# Patient Record
Sex: FEMALE | Race: WHITE | NOT HISPANIC OR LATINO | Employment: UNEMPLOYED | URBAN - METROPOLITAN AREA
[De-identification: names, ages, dates, MRNs, and addresses within clinical notes are randomized per-mention and may not be internally consistent; named-entity substitution may affect disease eponyms.]

---

## 2022-01-01 ENCOUNTER — APPOINTMENT (INPATIENT)
Dept: RADIOLOGY | Facility: HOSPITAL | Age: 72
End: 2022-01-01

## 2022-01-01 ENCOUNTER — APPOINTMENT (EMERGENCY)
Dept: RADIOLOGY | Facility: HOSPITAL | Age: 72
End: 2022-01-01

## 2022-01-01 ENCOUNTER — APPOINTMENT (OUTPATIENT)
Dept: RADIOLOGY | Facility: HOSPITAL | Age: 72
End: 2022-01-01

## 2022-01-01 ENCOUNTER — HOSPITAL ENCOUNTER (INPATIENT)
Facility: HOSPITAL | Age: 72
LOS: 4 days | End: 2022-11-16
Attending: EMERGENCY MEDICINE | Admitting: INTERNAL MEDICINE

## 2022-01-01 ENCOUNTER — APPOINTMENT (EMERGENCY)
Dept: CT IMAGING | Facility: HOSPITAL | Age: 72
End: 2022-01-01

## 2022-01-01 ENCOUNTER — APPOINTMENT (INPATIENT)
Dept: NON INVASIVE DIAGNOSTICS | Facility: HOSPITAL | Age: 72
End: 2022-01-01

## 2022-01-01 ENCOUNTER — APPOINTMENT (OUTPATIENT)
Dept: NON INVASIVE DIAGNOSTICS | Facility: HOSPITAL | Age: 72
End: 2022-01-01

## 2022-01-01 ENCOUNTER — EPISODE CHANGES (OUTPATIENT)
Dept: CASE MANAGEMENT | Facility: OTHER | Age: 72
End: 2022-01-01

## 2022-01-01 ENCOUNTER — HOSPITAL ENCOUNTER (EMERGENCY)
Facility: HOSPITAL | Age: 72
Discharge: NURSING, REHAB OR LONG-TERM CARE | End: 2022-11-12
Attending: EMERGENCY MEDICINE

## 2022-01-01 VITALS
RESPIRATION RATE: 15 BRPM | WEIGHT: 112.1 LBS | BODY MASS INDEX: 18.02 KG/M2 | HEART RATE: 86 BPM | OXYGEN SATURATION: 85 % | SYSTOLIC BLOOD PRESSURE: 138 MMHG | DIASTOLIC BLOOD PRESSURE: 62 MMHG | TEMPERATURE: 97.7 F | HEIGHT: 66 IN

## 2022-01-01 VITALS
SYSTOLIC BLOOD PRESSURE: 154 MMHG | WEIGHT: 150 LBS | HEART RATE: 64 BPM | TEMPERATURE: 98.2 F | BODY MASS INDEX: 24.11 KG/M2 | RESPIRATION RATE: 20 BRPM | DIASTOLIC BLOOD PRESSURE: 71 MMHG | OXYGEN SATURATION: 96 % | HEIGHT: 66 IN

## 2022-01-01 DIAGNOSIS — I50.9 HEART FAILURE (HCC): Primary | ICD-10-CM

## 2022-01-01 DIAGNOSIS — I48.92 ATRIAL FLUTTER WITH RAPID VENTRICULAR RESPONSE (HCC): ICD-10-CM

## 2022-01-01 DIAGNOSIS — J96.01 ACUTE RESPIRATORY FAILURE WITH HYPOXIA (HCC): ICD-10-CM

## 2022-01-01 DIAGNOSIS — I50.9 CHF (CONGESTIVE HEART FAILURE) (HCC): Primary | ICD-10-CM

## 2022-01-01 DIAGNOSIS — R79.89 ELEVATED LACTIC ACID LEVEL: ICD-10-CM

## 2022-01-01 DIAGNOSIS — N28.89 RIGHT RENAL MASS: ICD-10-CM

## 2022-01-01 DIAGNOSIS — R77.8 ELEVATED TROPONIN: ICD-10-CM

## 2022-01-01 DIAGNOSIS — J90 BILATERAL PLEURAL EFFUSION: ICD-10-CM

## 2022-01-01 DIAGNOSIS — K43.9 VENTRAL HERNIA WITHOUT OBSTRUCTION OR GANGRENE: ICD-10-CM

## 2022-01-01 LAB
2HR DELTA HS TROPONIN: 0 NG/L
4HR DELTA HS TROPONIN: -21 NG/L
ALBUMIN SERPL BCP-MCNC: 2.1 G/DL (ref 3.5–5)
ALBUMIN SERPL BCP-MCNC: 2.3 G/DL (ref 3.5–5)
ALBUMIN SERPL BCP-MCNC: 2.7 G/DL (ref 3.5–5)
ALBUMIN SERPL BCP-MCNC: 2.8 G/DL (ref 3.5–5)
ALP SERPL-CCNC: 57 U/L (ref 46–116)
ALP SERPL-CCNC: 67 U/L (ref 46–116)
ALP SERPL-CCNC: 82 U/L (ref 46–116)
ALP SERPL-CCNC: 84 U/L (ref 46–116)
ALT SERPL W P-5'-P-CCNC: 125 U/L (ref 12–78)
ALT SERPL W P-5'-P-CCNC: 156 U/L (ref 12–78)
ALT SERPL W P-5'-P-CCNC: 204 U/L (ref 12–78)
ALT SERPL W P-5'-P-CCNC: 289 U/L (ref 12–78)
ANION GAP SERPL CALCULATED.3IONS-SCNC: 2 MMOL/L (ref 4–13)
ANION GAP SERPL CALCULATED.3IONS-SCNC: 2 MMOL/L (ref 4–13)
ANION GAP SERPL CALCULATED.3IONS-SCNC: 4 MMOL/L (ref 4–13)
ANISOCYTOSIS BLD QL SMEAR: PRESENT
AORTIC ROOT: 3.3 CM
AORTIC VALVE MEAN VELOCITY: 19.5 M/S
APICAL FOUR CHAMBER EJECTION FRACTION: 30 %
APTT PPP: 24 SECONDS (ref 23–37)
APTT PPP: 25 SECONDS (ref 23–37)
APTT PPP: 26 SECONDS (ref 23–37)
APTT PPP: 51 SECONDS (ref 23–37)
APTT PPP: 57 SECONDS (ref 23–37)
APTT PPP: 68 SECONDS (ref 23–37)
APTT PPP: 68 SECONDS (ref 23–37)
APTT PPP: 76 SECONDS (ref 23–37)
APTT PPP: 78 SECONDS (ref 23–37)
APTT PPP: 86 SECONDS (ref 23–37)
ARTERIAL PATENCY WRIST A: YES
AST SERPL W P-5'-P-CCNC: 53 U/L (ref 5–45)
AST SERPL W P-5'-P-CCNC: 70 U/L (ref 5–45)
AST SERPL W P-5'-P-CCNC: 87 U/L (ref 5–45)
ATRIAL RATE: 114 BPM
ATRIAL RATE: 270 BPM
ATRIAL RATE: 270 BPM
ATRIAL RATE: 272 BPM
ATRIAL RATE: 272 BPM
ATRIAL RATE: 65 BPM
ATRIAL RATE: 66 BPM
ATRIAL RATE: 67 BPM
ATRIAL RATE: 69 BPM
ATRIAL RATE: 72 BPM
AV AREA BY CONTINUOUS VTI: 0.9 CM2
AV AREA PEAK VELOCITY: 0.8 CM2
AV LVOT MEAN GRADIENT: 2 MMHG
AV LVOT PEAK GRADIENT: 4 MMHG
AV MEAN GRADIENT: 19 MMHG
AV PEAK GRADIENT: 43 MMHG
AV VALVE AREA: 0.86 CM2
AV VELOCITY RATIO: 0.3
BACTERIA BLD CULT: NORMAL
BACTERIA BLD CULT: NORMAL
BACTERIA UR QL AUTO: NORMAL /HPF
BASE EX.OXY STD BLDV CALC-SCNC: 80.8 % (ref 60–80)
BASE EX.OXY STD BLDV CALC-SCNC: 85.8 % (ref 60–80)
BASE EXCESS BLDA CALC-SCNC: 12.2 MMOL/L
BASE EXCESS BLDA CALC-SCNC: 15.9 MMOL/L
BASE EXCESS BLDA CALC-SCNC: 16.7 MMOL/L
BASE EXCESS BLDA CALC-SCNC: 19.7 MMOL/L
BASE EXCESS BLDA CALC-SCNC: 21.2 MMOL/L
BASE EXCESS BLDA CALC-SCNC: 34.4 MMOL/L
BASE EXCESS BLDA CALC-SCNC: 8.4 MMOL/L
BASE EXCESS BLDV CALC-SCNC: 21.8 MMOL/L
BASE EXCESS BLDV CALC-SCNC: 22.7 MMOL/L
BASOPHILS # BLD AUTO: 0.02 THOUSANDS/ÂΜL (ref 0–0.1)
BASOPHILS # BLD MANUAL: 0 THOUSAND/UL (ref 0–0.1)
BASOPHILS NFR BLD AUTO: 0 % (ref 0–1)
BASOPHILS NFR MAR MANUAL: 0 % (ref 0–1)
BILIRUB DIRECT SERPL-MCNC: 0.74 MG/DL (ref 0–0.2)
BILIRUB SERPL-MCNC: 1.3 MG/DL (ref 0.2–1)
BILIRUB SERPL-MCNC: 1.48 MG/DL (ref 0.2–1)
BILIRUB SERPL-MCNC: 1.58 MG/DL (ref 0.2–1)
BILIRUB SERPL-MCNC: 1.72 MG/DL (ref 0.2–1)
BILIRUB UR QL STRIP: NEGATIVE
BODY TEMPERATURE: 97.7 DEGREES FEHRENHEIT
BUN SERPL-MCNC: 17 MG/DL (ref 5–25)
BUN SERPL-MCNC: 18 MG/DL (ref 5–25)
BUN SERPL-MCNC: 19 MG/DL (ref 5–25)
BUN SERPL-MCNC: 20 MG/DL (ref 5–25)
BUN SERPL-MCNC: 22 MG/DL (ref 5–25)
BUN SERPL-MCNC: 22 MG/DL (ref 5–25)
BUN SERPL-MCNC: 24 MG/DL (ref 5–25)
BUN SERPL-MCNC: 30 MG/DL (ref 5–25)
BUN SERPL-MCNC: 46 MG/DL (ref 5–25)
CALCIUM ALBUM COR SERPL-MCNC: 10.1 MG/DL (ref 8.3–10.1)
CALCIUM ALBUM COR SERPL-MCNC: 10.4 MG/DL (ref 8.3–10.1)
CALCIUM ALBUM COR SERPL-MCNC: 9.9 MG/DL (ref 8.3–10.1)
CALCIUM SERPL-MCNC: 8.5 MG/DL (ref 8.3–10.1)
CALCIUM SERPL-MCNC: 8.6 MG/DL (ref 8.3–10.1)
CALCIUM SERPL-MCNC: 8.7 MG/DL (ref 8.3–10.1)
CALCIUM SERPL-MCNC: 8.8 MG/DL (ref 8.3–10.1)
CALCIUM SERPL-MCNC: 9 MG/DL (ref 8.3–10.1)
CALCIUM SERPL-MCNC: 9 MG/DL (ref 8.3–10.1)
CALCIUM SERPL-MCNC: 9.1 MG/DL (ref 8.3–10.1)
CALCIUM SERPL-MCNC: 9.2 MG/DL (ref 8.3–10.1)
CALCIUM SERPL-MCNC: 9.4 MG/DL (ref 8.3–10.1)
CARDIAC TROPONIN I PNL SERPL HS: 61 NG/L
CARDIAC TROPONIN I PNL SERPL HS: 82 NG/L
CARDIAC TROPONIN I PNL SERPL HS: 82 NG/L
CHLORIDE SERPL-SCNC: 82 MMOL/L (ref 96–108)
CHLORIDE SERPL-SCNC: 83 MMOL/L (ref 96–108)
CHLORIDE SERPL-SCNC: 84 MMOL/L (ref 96–108)
CHLORIDE SERPL-SCNC: 88 MMOL/L (ref 96–108)
CHLORIDE SERPL-SCNC: 89 MMOL/L (ref 96–108)
CHLORIDE SERPL-SCNC: 91 MMOL/L (ref 96–108)
CHLORIDE SERPL-SCNC: 92 MMOL/L (ref 96–108)
CHLORIDE SERPL-SCNC: 96 MMOL/L (ref 96–108)
CHLORIDE SERPL-SCNC: 98 MMOL/L (ref 96–108)
CHOLEST SERPL-MCNC: 189 MG/DL
CLARITY UR: CLEAR
CO2 SERPL-SCNC: 36 MMOL/L (ref 21–32)
CO2 SERPL-SCNC: 41 MMOL/L (ref 21–32)
CO2 SERPL-SCNC: 44 MMOL/L (ref 21–32)
CO2 SERPL-SCNC: >45 MMOL/L (ref 21–32)
COLOR UR: YELLOW
CREAT SERPL-MCNC: 0.59 MG/DL (ref 0.6–1.3)
CREAT SERPL-MCNC: 0.62 MG/DL (ref 0.6–1.3)
CREAT SERPL-MCNC: 0.69 MG/DL (ref 0.6–1.3)
CREAT SERPL-MCNC: 0.7 MG/DL (ref 0.6–1.3)
CREAT SERPL-MCNC: 0.76 MG/DL (ref 0.6–1.3)
CREAT SERPL-MCNC: 0.79 MG/DL (ref 0.6–1.3)
CREAT SERPL-MCNC: 0.8 MG/DL (ref 0.6–1.3)
CREAT SERPL-MCNC: 0.81 MG/DL (ref 0.6–1.3)
CREAT SERPL-MCNC: 1.15 MG/DL (ref 0.6–1.3)
D DIMER PPP FEU-MCNC: 7.46 UG/ML FEU
DIGOXIN SERPL-MCNC: 1.9 NG/ML (ref 0.8–2)
DOP CALC AO PEAK VEL: 3.19 M/S
DOP CALC AO VTI: 46.34 CM
DOP CALC LVOT AREA: 3.14 CM2
DOP CALC LVOT DIAMETER: 2 CM
DOP CALC LVOT PEAK VEL VTI: 12.72 CM
DOP CALC LVOT PEAK VEL: 0.96 M/S
DOP CALC LVOT STROKE INDEX: 23.7 ML/M2
DOP CALC LVOT STROKE VOLUME: 39.94
EOSINOPHIL # BLD AUTO: 0.01 THOUSAND/ÂΜL (ref 0–0.61)
EOSINOPHIL # BLD MANUAL: 0 THOUSAND/UL (ref 0–0.4)
EOSINOPHIL NFR BLD AUTO: 0 % (ref 0–6)
EOSINOPHIL NFR BLD MANUAL: 0 % (ref 0–6)
ERYTHROCYTE [DISTWIDTH] IN BLOOD BY AUTOMATED COUNT: 15.2 % (ref 11.6–15.1)
ERYTHROCYTE [DISTWIDTH] IN BLOOD BY AUTOMATED COUNT: 15.2 % (ref 11.6–15.1)
ERYTHROCYTE [DISTWIDTH] IN BLOOD BY AUTOMATED COUNT: 15.3 % (ref 11.6–15.1)
ERYTHROCYTE [DISTWIDTH] IN BLOOD BY AUTOMATED COUNT: 15.4 % (ref 11.6–15.1)
ERYTHROCYTE [DISTWIDTH] IN BLOOD BY AUTOMATED COUNT: 15.7 % (ref 11.6–15.1)
ERYTHROCYTE [DISTWIDTH] IN BLOOD BY AUTOMATED COUNT: 15.7 % (ref 11.6–15.1)
EST. AVERAGE GLUCOSE BLD GHB EST-MCNC: 123 MG/DL
FLUAV RNA RESP QL NAA+PROBE: NEGATIVE
FLUBV RNA RESP QL NAA+PROBE: NEGATIVE
FRACTIONAL SHORTENING: 20 (ref 28–44)
GFR SERPL CREATININE-BSD FRML MDRD: 47 ML/MIN/1.73SQ M
GFR SERPL CREATININE-BSD FRML MDRD: 73 ML/MIN/1.73SQ M
GFR SERPL CREATININE-BSD FRML MDRD: 74 ML/MIN/1.73SQ M
GFR SERPL CREATININE-BSD FRML MDRD: 75 ML/MIN/1.73SQ M
GFR SERPL CREATININE-BSD FRML MDRD: 79 ML/MIN/1.73SQ M
GFR SERPL CREATININE-BSD FRML MDRD: 87 ML/MIN/1.73SQ M
GFR SERPL CREATININE-BSD FRML MDRD: 87 ML/MIN/1.73SQ M
GFR SERPL CREATININE-BSD FRML MDRD: 91 ML/MIN/1.73SQ M
GFR SERPL CREATININE-BSD FRML MDRD: 92 ML/MIN/1.73SQ M
GLUCOSE SERPL-MCNC: 101 MG/DL (ref 65–140)
GLUCOSE SERPL-MCNC: 104 MG/DL (ref 65–140)
GLUCOSE SERPL-MCNC: 110 MG/DL (ref 65–140)
GLUCOSE SERPL-MCNC: 111 MG/DL (ref 65–140)
GLUCOSE SERPL-MCNC: 111 MG/DL (ref 65–140)
GLUCOSE SERPL-MCNC: 115 MG/DL (ref 65–140)
GLUCOSE SERPL-MCNC: 126 MG/DL (ref 65–140)
GLUCOSE SERPL-MCNC: 136 MG/DL (ref 65–140)
GLUCOSE SERPL-MCNC: 137 MG/DL (ref 65–140)
GLUCOSE SERPL-MCNC: 138 MG/DL (ref 65–140)
GLUCOSE SERPL-MCNC: 145 MG/DL (ref 65–140)
GLUCOSE SERPL-MCNC: 150 MG/DL (ref 65–140)
GLUCOSE SERPL-MCNC: 154 MG/DL (ref 65–140)
GLUCOSE SERPL-MCNC: 169 MG/DL (ref 65–140)
GLUCOSE SERPL-MCNC: 175 MG/DL (ref 65–140)
GLUCOSE SERPL-MCNC: 99 MG/DL (ref 65–140)
GLUCOSE SERPL-MCNC: 99 MG/DL (ref 65–140)
GLUCOSE UR STRIP-MCNC: NEGATIVE MG/DL
HBA1C MFR BLD: 5.9 %
HCO3 BLDA-SCNC: 39.1 MMOL/L (ref 22–28)
HCO3 BLDA-SCNC: 41.7 MMOL/L (ref 22–28)
HCO3 BLDA-SCNC: 44.4 MMOL/L (ref 22–28)
HCO3 BLDA-SCNC: 45.7 MMOL/L (ref 22–28)
HCO3 BLDA-SCNC: 48.8 MMOL/L (ref 22–28)
HCO3 BLDA-SCNC: 50 MMOL/L (ref 22–28)
HCO3 BLDA-SCNC: 67.9 MMOL/L (ref 22–28)
HCO3 BLDV-SCNC: 50.5 MMOL/L (ref 24–30)
HCO3 BLDV-SCNC: 53 MMOL/L (ref 24–30)
HCT VFR BLD AUTO: 37.9 % (ref 34.8–46.1)
HCT VFR BLD AUTO: 38.6 % (ref 34.8–46.1)
HCT VFR BLD AUTO: 38.7 % (ref 34.8–46.1)
HCT VFR BLD AUTO: 43.1 % (ref 34.8–46.1)
HCT VFR BLD AUTO: 43.5 % (ref 34.8–46.1)
HCT VFR BLD AUTO: 44.6 % (ref 34.8–46.1)
HDLC SERPL-MCNC: 42 MG/DL
HGB BLD-MCNC: 11.7 G/DL (ref 11.5–15.4)
HGB BLD-MCNC: 11.8 G/DL (ref 11.5–15.4)
HGB BLD-MCNC: 12.1 G/DL (ref 11.5–15.4)
HGB BLD-MCNC: 13.5 G/DL (ref 11.5–15.4)
HGB BLD-MCNC: 13.8 G/DL (ref 11.5–15.4)
HGB BLD-MCNC: 14.2 G/DL (ref 11.5–15.4)
HGB UR QL STRIP.AUTO: ABNORMAL
IMM GRANULOCYTES # BLD AUTO: 0.02 THOUSAND/UL (ref 0–0.2)
IMM GRANULOCYTES NFR BLD AUTO: 0 % (ref 0–2)
INR PPP: 1.11 (ref 0.84–1.19)
INR PPP: 1.22 (ref 0.84–1.19)
INR PPP: 1.26 (ref 0.84–1.19)
INTERVENTRICULAR SEPTUM IN DIASTOLE (PARASTERNAL SHORT AXIS VIEW): 1.5 CM
INTERVENTRICULAR SEPTUM: 1.5 CM (ref 0.6–1.1)
IPAP: 18
KETONES UR STRIP-MCNC: NEGATIVE MG/DL
LAAS-AP2: 32 CM2
LAAS-AP4: 32.4 CM2
LACTATE SERPL-SCNC: 1.4 MMOL/L (ref 0.5–2)
LACTATE SERPL-SCNC: 2.2 MMOL/L (ref 0.5–2)
LDLC SERPL CALC-MCNC: 126 MG/DL (ref 0–100)
LEFT ATRIUM SIZE: 4.7 CM
LEFT INTERNAL DIMENSION IN SYSTOLE: 4.1 CM (ref 2.1–4)
LEFT VENTRICLE DIASTOLIC VOLUME (MOD BIPLANE): 140 ML
LEFT VENTRICLE SYSTOLIC VOLUME (MOD BIPLANE): 90 ML
LEFT VENTRICULAR INTERNAL DIMENSION IN DIASTOLE: 5.1 CM (ref 3.5–6)
LEFT VENTRICULAR POSTERIOR WALL IN END DIASTOLE: 1.6 CM
LEFT VENTRICULAR STROKE VOLUME: 50 ML
LEUKOCYTE ESTERASE UR QL STRIP: NEGATIVE
LG PLATELETS BLD QL SMEAR: PRESENT
LV EF: 36 %
LVSV (TEICH): 50 ML
LYMPHOCYTES # BLD AUTO: 0.26 THOUSAND/UL (ref 0.6–4.47)
LYMPHOCYTES # BLD AUTO: 0.39 THOUSANDS/ÂΜL (ref 0.6–4.47)
LYMPHOCYTES # BLD AUTO: 2 % (ref 14–44)
LYMPHOCYTES NFR BLD AUTO: 5 % (ref 14–44)
MACROCYTES BLD QL AUTO: PRESENT
MAGNESIUM SERPL-MCNC: 1.4 MG/DL (ref 1.6–2.6)
MAGNESIUM SERPL-MCNC: 1.7 MG/DL (ref 1.6–2.6)
MAGNESIUM SERPL-MCNC: 1.9 MG/DL (ref 1.6–2.6)
MAGNESIUM SERPL-MCNC: 2 MG/DL (ref 1.6–2.6)
MCH RBC QN AUTO: 29.9 PG (ref 26.8–34.3)
MCH RBC QN AUTO: 30.4 PG (ref 26.8–34.3)
MCH RBC QN AUTO: 30.4 PG (ref 26.8–34.3)
MCH RBC QN AUTO: 30.8 PG (ref 26.8–34.3)
MCH RBC QN AUTO: 30.8 PG (ref 26.8–34.3)
MCH RBC QN AUTO: 31.2 PG (ref 26.8–34.3)
MCHC RBC AUTO-ENTMCNC: 30.2 G/DL (ref 31.4–37.4)
MCHC RBC AUTO-ENTMCNC: 30.6 G/DL (ref 31.4–37.4)
MCHC RBC AUTO-ENTMCNC: 31.3 G/DL (ref 31.4–37.4)
MCHC RBC AUTO-ENTMCNC: 31.7 G/DL (ref 31.4–37.4)
MCHC RBC AUTO-ENTMCNC: 31.8 G/DL (ref 31.4–37.4)
MCHC RBC AUTO-ENTMCNC: 31.9 G/DL (ref 31.4–37.4)
MCV RBC AUTO: 101 FL (ref 82–98)
MCV RBC AUTO: 96 FL (ref 82–98)
MCV RBC AUTO: 96 FL (ref 82–98)
MCV RBC AUTO: 98 FL (ref 82–98)
METAMYELOCYTES NFR BLD MANUAL: 1 % (ref 0–1)
MONOCYTES # BLD AUTO: 0.39 THOUSAND/UL (ref 0–1.22)
MONOCYTES # BLD AUTO: 0.58 THOUSAND/ÂΜL (ref 0.17–1.22)
MONOCYTES NFR BLD AUTO: 8 % (ref 4–12)
MONOCYTES NFR BLD: 3 % (ref 4–12)
NASAL CANNULA: 2
NEUTROPHILS # BLD AUTO: 6.22 THOUSANDS/ÂΜL (ref 1.85–7.62)
NEUTROPHILS # BLD MANUAL: 12.25 THOUSAND/UL (ref 1.85–7.62)
NEUTS BAND NFR BLD MANUAL: 14 % (ref 0–8)
NEUTS SEG NFR BLD AUTO: 80 % (ref 43–75)
NEUTS SEG NFR BLD AUTO: 87 % (ref 43–75)
NITRITE UR QL STRIP: NEGATIVE
NON VENT- BIPAP: ABNORMAL
NON-SQ EPI CELLS URNS QL MICRO: NORMAL /HPF
NRBC BLD AUTO-RTO: 0 /100 WBCS
NT-PROBNP SERPL-MCNC: ABNORMAL PG/ML
O2 CT BLDA-SCNC: 16.1 ML/DL (ref 16–23)
O2 CT BLDA-SCNC: 16.4 ML/DL (ref 16–23)
O2 CT BLDA-SCNC: 17.1 ML/DL (ref 16–23)
O2 CT BLDA-SCNC: 17.1 ML/DL (ref 16–23)
O2 CT BLDA-SCNC: 17.2 ML/DL (ref 16–23)
O2 CT BLDA-SCNC: 17.3 ML/DL (ref 16–23)
O2 CT BLDA-SCNC: 17.7 ML/DL (ref 16–23)
O2 CT BLDV-SCNC: 15 ML/DL
O2 CT BLDV-SCNC: 15.8 ML/DL
OXYHGB MFR BLDA: 85.1 % (ref 94–97)
OXYHGB MFR BLDA: 85.7 % (ref 94–97)
OXYHGB MFR BLDA: 91.7 % (ref 94–97)
OXYHGB MFR BLDA: 91.8 % (ref 94–97)
OXYHGB MFR BLDA: 91.9 % (ref 94–97)
OXYHGB MFR BLDA: 94.5 % (ref 94–97)
OXYHGB MFR BLDA: 94.5 % (ref 94–97)
P AXIS: 126 DEGREES
P AXIS: 247 DEGREES
P AXIS: 247 DEGREES
P AXIS: 267 DEGREES
P AXIS: 267 DEGREES
P AXIS: 69 DEGREES
P AXIS: 73 DEGREES
P AXIS: 91 DEGREES
PCO2 BLDA: 122.6 MM HG (ref 36–44)
PCO2 BLDA: 74.8 MM HG (ref 36–44)
PCO2 BLDA: 77.7 MM HG (ref 36–44)
PCO2 BLDA: 78.6 MM HG (ref 36–44)
PCO2 BLDA: 78.8 MM HG (ref 36–44)
PCO2 BLDA: 81.4 MM HG (ref 36–44)
PCO2 BLDA: 87 MM HG (ref 36–44)
PCO2 BLDV: 76.4 MM HG (ref 42–50)
PCO2 BLDV: 90.6 MM HG (ref 42–50)
PCO2 TEMP ADJ BLDA: 77.1 MM HG (ref 36–44)
PEEP MAX SETTING VENT: 10 CM[H2O]
PH BLD: 7.35 [PH] (ref 7.35–7.45)
PH BLDA: 7.27 [PH] (ref 7.35–7.45)
PH BLDA: 7.34 [PH] (ref 7.35–7.45)
PH BLDA: 7.36 [PH] (ref 7.35–7.45)
PH BLDA: 7.39 [PH] (ref 7.35–7.45)
PH BLDA: 7.39 [PH] (ref 7.35–7.45)
PH BLDA: 7.4 [PH] (ref 7.35–7.45)
PH BLDA: 7.42 [PH] (ref 7.35–7.45)
PH BLDV: 7.38 [PH] (ref 7.3–7.4)
PH BLDV: 7.44 [PH] (ref 7.3–7.4)
PH UR STRIP.AUTO: 5.5 [PH]
PHOSPHATE SERPL-MCNC: 2.3 MG/DL (ref 2.3–4.1)
PHOSPHATE SERPL-MCNC: 2.7 MG/DL (ref 2.3–4.1)
PHOSPHATE SERPL-MCNC: 2.8 MG/DL (ref 2.3–4.1)
PLATELET # BLD AUTO: 104 THOUSANDS/UL (ref 149–390)
PLATELET # BLD AUTO: 115 THOUSANDS/UL (ref 149–390)
PLATELET # BLD AUTO: 118 THOUSANDS/UL (ref 149–390)
PLATELET # BLD AUTO: 83 THOUSANDS/UL (ref 149–390)
PLATELET # BLD AUTO: 89 THOUSANDS/UL (ref 149–390)
PLATELET # BLD AUTO: 99 THOUSANDS/UL (ref 149–390)
PLATELET BLD QL SMEAR: ABNORMAL
PLATELET CLUMP BLD QL SMEAR: PRESENT
PMV BLD AUTO: 10.8 FL (ref 8.9–12.7)
PMV BLD AUTO: 10.8 FL (ref 8.9–12.7)
PMV BLD AUTO: 11 FL (ref 8.9–12.7)
PMV BLD AUTO: 11.2 FL (ref 8.9–12.7)
PMV BLD AUTO: 11.6 FL (ref 8.9–12.7)
PMV BLD AUTO: 11.7 FL (ref 8.9–12.7)
PO2 BLD: 51.4 MM HG (ref 75–129)
PO2 BLDA: 53.2 MM HG (ref 75–129)
PO2 BLDA: 56.7 MM HG (ref 75–129)
PO2 BLDA: 62.1 MM HG (ref 75–129)
PO2 BLDA: 65.5 MM HG (ref 75–129)
PO2 BLDA: 66.9 MM HG (ref 75–129)
PO2 BLDA: 81.2 MM HG (ref 75–129)
PO2 BLDA: 83.1 MM HG (ref 75–129)
PO2 BLDV: 46.5 MM HG (ref 35–45)
PO2 BLDV: 48.6 MM HG (ref 35–45)
POTASSIUM SERPL-SCNC: 3.2 MMOL/L (ref 3.5–5.3)
POTASSIUM SERPL-SCNC: 3.2 MMOL/L (ref 3.5–5.3)
POTASSIUM SERPL-SCNC: 3.5 MMOL/L (ref 3.5–5.3)
POTASSIUM SERPL-SCNC: 3.6 MMOL/L (ref 3.5–5.3)
POTASSIUM SERPL-SCNC: 3.6 MMOL/L (ref 3.5–5.3)
POTASSIUM SERPL-SCNC: 3.9 MMOL/L (ref 3.5–5.3)
POTASSIUM SERPL-SCNC: 4.9 MMOL/L (ref 3.5–5.3)
PR INTERVAL: 0 MS
PR INTERVAL: 138 MS
PR INTERVAL: 150 MS
PR INTERVAL: 150 MS
PR INTERVAL: 176 MS
PR INTERVAL: 532 MS
PROCALCITONIN SERPL-MCNC: 0.19 NG/ML
PROT SERPL-MCNC: 5.6 G/DL (ref 6.4–8.4)
PROT SERPL-MCNC: 5.7 G/DL (ref 6.4–8.4)
PROT SERPL-MCNC: 6.2 G/DL (ref 6.4–8.4)
PROT SERPL-MCNC: 6.7 G/DL (ref 6.4–8.4)
PROT UR STRIP-MCNC: ABNORMAL MG/DL
PROTHROMBIN TIME: 14.6 SECONDS (ref 11.6–14.5)
PROTHROMBIN TIME: 15.6 SECONDS (ref 11.6–14.5)
PROTHROMBIN TIME: 16 SECONDS (ref 11.6–14.5)
QRS AXIS: 55 DEGREES
QRS AXIS: 6 DEGREES
QRS AXIS: 6 DEGREES
QRS AXIS: 67 DEGREES
QRS AXIS: 74 DEGREES
QRS AXIS: 74 DEGREES
QRS AXIS: 81 DEGREES
QRS AXIS: 82 DEGREES
QRS AXIS: 84 DEGREES
QRS AXIS: 93 DEGREES
QRSD INTERVAL: 100 MS
QRSD INTERVAL: 100 MS
QRSD INTERVAL: 104 MS
QRSD INTERVAL: 104 MS
QRSD INTERVAL: 106 MS
QRSD INTERVAL: 106 MS
QRSD INTERVAL: 108 MS
QRSD INTERVAL: 121 MS
QRSD INTERVAL: 126 MS
QRSD INTERVAL: 126 MS
QT INTERVAL: 280 MS
QT INTERVAL: 280 MS
QT INTERVAL: 304 MS
QT INTERVAL: 304 MS
QT INTERVAL: 313 MS
QT INTERVAL: 371 MS
QT INTERVAL: 374 MS
QT INTERVAL: 379 MS
QT INTERVAL: 388 MS
QT INTERVAL: 388 MS
QTC INTERVAL: 388 MS
QTC INTERVAL: 406 MS
QTC INTERVAL: 406 MS
QTC INTERVAL: 407 MS
QTC INTERVAL: 410 MS
QTC INTERVAL: 420 MS
QTC INTERVAL: 420 MS
QTC INTERVAL: 431 MS
QTC INTERVAL: 457 MS
QTC INTERVAL: 457 MS
RA PRESSURE ESTIMATED: 15 MMHG
RBC # BLD AUTO: 3.85 MILLION/UL (ref 3.81–5.12)
RBC # BLD AUTO: 3.93 MILLION/UL (ref 3.81–5.12)
RBC # BLD AUTO: 3.94 MILLION/UL (ref 3.81–5.12)
RBC # BLD AUTO: 4.38 MILLION/UL (ref 3.81–5.12)
RBC # BLD AUTO: 4.43 MILLION/UL (ref 3.81–5.12)
RBC # BLD AUTO: 4.67 MILLION/UL (ref 3.81–5.12)
RBC #/AREA URNS AUTO: NORMAL /HPF
RIGHT ATRIAL 2D VOLUME: 55 ML
RIGHT ATRIUM AREA SYSTOLE A4C: 20.1 CM2
RIGHT VENTRICLE ID DIMENSION: 3.7 CM
RSV RNA RESP QL NAA+PROBE: NEGATIVE
RV PSP: 55 MMHG
SARS-COV-2 RNA RESP QL NAA+PROBE: NEGATIVE
SL CV LEFT ATRIUM LENGTH A2C: 6.8 CM
SL CV LV EF: 30
SL CV PED ECHO LEFT VENTRICLE DIASTOLIC VOLUME (MOD BIPLANE) 2D: 125 ML
SL CV PED ECHO LEFT VENTRICLE SYSTOLIC VOLUME (MOD BIPLANE) 2D: 75 ML
SODIUM SERPL-SCNC: 133 MMOL/L (ref 135–147)
SODIUM SERPL-SCNC: 134 MMOL/L (ref 135–147)
SODIUM SERPL-SCNC: 135 MMOL/L (ref 135–147)
SODIUM SERPL-SCNC: 136 MMOL/L (ref 135–147)
SODIUM SERPL-SCNC: 138 MMOL/L (ref 135–147)
SODIUM SERPL-SCNC: 139 MMOL/L (ref 135–147)
SODIUM SERPL-SCNC: 140 MMOL/L (ref 135–147)
SP GR UR STRIP.AUTO: 1.01 (ref 1–1.03)
SPECIMEN SOURCE: ABNORMAL
T WAVE AXIS: -89 DEGREES
T WAVE AXIS: 232 DEGREES
T WAVE AXIS: 252 DEGREES
T WAVE AXIS: 252 DEGREES
T WAVE AXIS: 256 DEGREES
T WAVE AXIS: 256 DEGREES
T WAVE AXIS: 261 DEGREES
T WAVE AXIS: 263 DEGREES
T WAVE AXIS: 267 DEGREES
T WAVE AXIS: 268 DEGREES
TR MAX PG: 40 MMHG
TR PEAK VELOCITY: 3.2 M/S
TRICUSPID VALVE PEAK REGURGITATION VELOCITY: 3.15 M/S
TRIGL SERPL-MCNC: 106 MG/DL
TSH SERPL DL<=0.05 MIU/L-ACNC: 3.38 UIU/ML (ref 0.45–4.5)
UROBILINOGEN UR QL STRIP.AUTO: 1 E.U./DL
VENT BIPAP FIO2: 50 %
VENT BIPAP FIO2: 70 %
VENT BIPAP FIO2: 70 %
VENT BIPAP FIO2: 90 %
VENTRICULAR RATE: 114 BPM
VENTRICULAR RATE: 135 BPM
VENTRICULAR RATE: 135 BPM
VENTRICULAR RATE: 136 BPM
VENTRICULAR RATE: 136 BPM
VENTRICULAR RATE: 65 BPM
VENTRICULAR RATE: 66 BPM
VENTRICULAR RATE: 67 BPM
VENTRICULAR RATE: 69 BPM
VENTRICULAR RATE: 72 BPM
WBC # BLD AUTO: 13.03 THOUSAND/UL (ref 4.31–10.16)
WBC # BLD AUTO: 7.07 THOUSAND/UL (ref 4.31–10.16)
WBC # BLD AUTO: 7.24 THOUSAND/UL (ref 4.31–10.16)
WBC # BLD AUTO: 7.89 THOUSAND/UL (ref 4.31–10.16)
WBC # BLD AUTO: 9.06 THOUSAND/UL (ref 4.31–10.16)
WBC # BLD AUTO: 9.72 THOUSAND/UL (ref 4.31–10.16)
WBC #/AREA URNS AUTO: NORMAL /HPF

## 2022-01-01 RX ORDER — ISOSORBIDE DINITRATE 20 MG/1
20 TABLET ORAL
Status: DISCONTINUED | OUTPATIENT
Start: 2022-01-01 | End: 2022-01-01

## 2022-01-01 RX ORDER — FUROSEMIDE 10 MG/ML
80 INJECTION INTRAMUSCULAR; INTRAVENOUS
Status: DISCONTINUED | OUTPATIENT
Start: 2022-01-01 | End: 2022-01-01

## 2022-01-01 RX ORDER — FUROSEMIDE 10 MG/ML
40 INJECTION INTRAMUSCULAR; INTRAVENOUS ONCE
Status: COMPLETED | OUTPATIENT
Start: 2022-01-01 | End: 2022-01-01

## 2022-01-01 RX ORDER — HEPARIN SODIUM 10000 [USP'U]/100ML
3-20 INJECTION, SOLUTION INTRAVENOUS
Status: DISCONTINUED | OUTPATIENT
Start: 2022-01-01 | End: 2022-01-01

## 2022-01-01 RX ORDER — ISOSORBIDE DINITRATE 10 MG/1
10 TABLET ORAL
Status: DISCONTINUED | OUTPATIENT
Start: 2022-01-01 | End: 2022-01-01

## 2022-01-01 RX ORDER — NITROGLYCERIN 20 MG/100ML
5-200 INJECTION INTRAVENOUS
Status: DISCONTINUED | OUTPATIENT
Start: 2022-01-01 | End: 2022-01-01

## 2022-01-01 RX ORDER — LIDOCAINE HYDROCHLORIDE 20 MG/ML
1 JELLY TOPICAL ONCE
Status: DISCONTINUED | OUTPATIENT
Start: 2022-01-01 | End: 2022-01-01

## 2022-01-01 RX ORDER — HEPARIN SODIUM 1000 [USP'U]/ML
1950 INJECTION, SOLUTION INTRAVENOUS; SUBCUTANEOUS
Status: DISCONTINUED | OUTPATIENT
Start: 2022-01-01 | End: 2022-01-01

## 2022-01-01 RX ORDER — LORAZEPAM 2 MG/ML
0.5 INJECTION INTRAMUSCULAR
Status: DISCONTINUED | OUTPATIENT
Start: 2022-01-01 | End: 2022-01-01 | Stop reason: HOSPADM

## 2022-01-01 RX ORDER — HEPARIN SODIUM 10000 [USP'U]/100ML
3-20 INJECTION, SOLUTION INTRAVENOUS
Status: DISCONTINUED | OUTPATIENT
Start: 2022-01-01 | End: 2022-01-01 | Stop reason: HOSPADM

## 2022-01-01 RX ORDER — LABETALOL HYDROCHLORIDE 5 MG/ML
10 INJECTION, SOLUTION INTRAVENOUS EVERY 6 HOURS
Status: DISCONTINUED | OUTPATIENT
Start: 2022-01-01 | End: 2022-01-01

## 2022-01-01 RX ORDER — CAPTOPRIL 12.5 MG/1
12.5 TABLET ORAL 3 TIMES DAILY
Status: DISCONTINUED | OUTPATIENT
Start: 2022-01-01 | End: 2022-01-01

## 2022-01-01 RX ORDER — LIDOCAINE 40 MG/G
CREAM TOPICAL ONCE
Status: DISCONTINUED | OUTPATIENT
Start: 2022-01-01 | End: 2022-01-01 | Stop reason: HOSPADM

## 2022-01-01 RX ORDER — LORAZEPAM 2 MG/ML
0.5 INJECTION INTRAMUSCULAR
Status: COMPLETED | OUTPATIENT
Start: 2022-01-01 | End: 2022-01-01

## 2022-01-01 RX ORDER — HYDRALAZINE HYDROCHLORIDE 50 MG/1
100 TABLET, FILM COATED ORAL EVERY 8 HOURS SCHEDULED
Status: DISCONTINUED | OUTPATIENT
Start: 2022-01-01 | End: 2022-01-01

## 2022-01-01 RX ORDER — POTASSIUM CHLORIDE 20 MEQ/1
40 TABLET, EXTENDED RELEASE ORAL 4 TIMES DAILY
Status: COMPLETED | OUTPATIENT
Start: 2022-01-01 | End: 2022-01-01

## 2022-01-01 RX ORDER — GLYCOPYRROLATE 0.2 MG/ML
0.1 INJECTION INTRAMUSCULAR; INTRAVENOUS EVERY 4 HOURS PRN
Status: DISCONTINUED | OUTPATIENT
Start: 2022-01-01 | End: 2022-01-01 | Stop reason: HOSPADM

## 2022-01-01 RX ORDER — HYDRALAZINE HYDROCHLORIDE 20 MG/ML
5 INJECTION INTRAMUSCULAR; INTRAVENOUS EVERY 8 HOURS
Status: DISCONTINUED | OUTPATIENT
Start: 2022-01-01 | End: 2022-01-01

## 2022-01-01 RX ORDER — POTASSIUM CHLORIDE 20 MEQ/1
40 TABLET, EXTENDED RELEASE ORAL ONCE
Status: COMPLETED | OUTPATIENT
Start: 2022-01-01 | End: 2022-01-01

## 2022-01-01 RX ORDER — MAGNESIUM SULFATE HEPTAHYDRATE 40 MG/ML
2 INJECTION, SOLUTION INTRAVENOUS ONCE
Status: COMPLETED | OUTPATIENT
Start: 2022-01-01 | End: 2022-01-01

## 2022-01-01 RX ORDER — NITROGLYCERIN 0.4 MG/1
0.4 TABLET SUBLINGUAL ONCE
Status: COMPLETED | OUTPATIENT
Start: 2022-01-01 | End: 2022-01-01

## 2022-01-01 RX ORDER — HEPARIN SODIUM 5000 [USP'U]/ML
5000 INJECTION, SOLUTION INTRAVENOUS; SUBCUTANEOUS EVERY 8 HOURS SCHEDULED
Status: DISCONTINUED | OUTPATIENT
Start: 2022-01-01 | End: 2022-01-01 | Stop reason: SDUPTHER

## 2022-01-01 RX ORDER — HYDRALAZINE HYDROCHLORIDE 20 MG/ML
10 INJECTION INTRAMUSCULAR; INTRAVENOUS EVERY 8 HOURS
Status: DISCONTINUED | OUTPATIENT
Start: 2022-01-01 | End: 2022-01-01

## 2022-01-01 RX ORDER — FUROSEMIDE 10 MG/ML
60 INJECTION INTRAMUSCULAR; INTRAVENOUS
Status: DISCONTINUED | OUTPATIENT
Start: 2022-01-01 | End: 2022-01-01

## 2022-01-01 RX ORDER — HEPARIN SODIUM 1000 [USP'U]/ML
1950 INJECTION, SOLUTION INTRAVENOUS; SUBCUTANEOUS
Status: DISCONTINUED | OUTPATIENT
Start: 2022-01-01 | End: 2022-01-01 | Stop reason: HOSPADM

## 2022-01-01 RX ORDER — METOPROLOL TARTRATE 5 MG/5ML
5 INJECTION INTRAVENOUS ONCE
Status: DISCONTINUED | OUTPATIENT
Start: 2022-01-01 | End: 2022-01-01 | Stop reason: HOSPADM

## 2022-01-01 RX ORDER — HEPARIN SODIUM 1000 [USP'U]/ML
3900 INJECTION, SOLUTION INTRAVENOUS; SUBCUTANEOUS
Status: DISCONTINUED | OUTPATIENT
Start: 2022-01-01 | End: 2022-01-01

## 2022-01-01 RX ORDER — DIGOXIN 0.25 MG/ML
125 INJECTION INTRAMUSCULAR; INTRAVENOUS EVERY OTHER DAY
Status: DISCONTINUED | OUTPATIENT
Start: 2022-01-01 | End: 2022-01-01

## 2022-01-01 RX ORDER — SODIUM CHLORIDE, SODIUM GLUCONATE, SODIUM ACETATE, POTASSIUM CHLORIDE, MAGNESIUM CHLORIDE, SODIUM PHOSPHATE, DIBASIC, AND POTASSIUM PHOSPHATE .53; .5; .37; .037; .03; .012; .00082 G/100ML; G/100ML; G/100ML; G/100ML; G/100ML; G/100ML; G/100ML
250 INJECTION, SOLUTION INTRAVENOUS ONCE
Status: COMPLETED | OUTPATIENT
Start: 2022-01-01 | End: 2022-01-01

## 2022-01-01 RX ORDER — CARVEDILOL 3.12 MG/1
3.12 TABLET ORAL 2 TIMES DAILY WITH MEALS
Status: DISCONTINUED | OUTPATIENT
Start: 2022-01-01 | End: 2022-01-01

## 2022-01-01 RX ORDER — NITROGLYCERIN 20 MG/100ML
5-200 INJECTION INTRAVENOUS
Status: DISCONTINUED | OUTPATIENT
Start: 2022-01-01 | End: 2022-01-01 | Stop reason: HOSPADM

## 2022-01-01 RX ORDER — HEPARIN SODIUM 1000 [USP'U]/ML
3900 INJECTION, SOLUTION INTRAVENOUS; SUBCUTANEOUS ONCE
Status: COMPLETED | OUTPATIENT
Start: 2022-01-01 | End: 2022-01-01

## 2022-01-01 RX ORDER — HEPARIN SODIUM 1000 [USP'U]/ML
3900 INJECTION, SOLUTION INTRAVENOUS; SUBCUTANEOUS
Status: DISCONTINUED | OUTPATIENT
Start: 2022-01-01 | End: 2022-01-01 | Stop reason: HOSPADM

## 2022-01-01 RX ORDER — DILTIAZEM HYDROCHLORIDE 5 MG/ML
15 INJECTION INTRAVENOUS ONCE
Status: COMPLETED | OUTPATIENT
Start: 2022-01-01 | End: 2022-01-01

## 2022-01-01 RX ORDER — FUROSEMIDE 10 MG/ML
20 INJECTION INTRAMUSCULAR; INTRAVENOUS ONCE
Status: COMPLETED | OUTPATIENT
Start: 2022-01-01 | End: 2022-01-01

## 2022-01-01 RX ORDER — POTASSIUM CHLORIDE 14.9 MG/ML
20 INJECTION INTRAVENOUS
Status: DISCONTINUED | OUTPATIENT
Start: 2022-01-01 | End: 2022-01-01

## 2022-01-01 RX ORDER — ISOSORBIDE DINITRATE 20 MG/1
20 TABLET ORAL EVERY 8 HOURS
Status: DISCONTINUED | OUTPATIENT
Start: 2022-01-01 | End: 2022-01-01

## 2022-01-01 RX ORDER — ISOSORBIDE DINITRATE 30 MG/1
30 TABLET ORAL EVERY 8 HOURS
Status: DISCONTINUED | OUTPATIENT
Start: 2022-01-01 | End: 2022-01-01

## 2022-01-01 RX ORDER — ALBUTEROL SULFATE 2.5 MG/3ML
2.5 SOLUTION RESPIRATORY (INHALATION) EVERY 4 HOURS PRN
Status: DISCONTINUED | OUTPATIENT
Start: 2022-01-01 | End: 2022-01-01

## 2022-01-01 RX ORDER — INSULIN LISPRO 100 [IU]/ML
1-5 INJECTION, SOLUTION INTRAVENOUS; SUBCUTANEOUS
Status: DISCONTINUED | OUTPATIENT
Start: 2022-01-01 | End: 2022-01-01

## 2022-01-01 RX ORDER — DIGOXIN 0.25 MG/ML
250 INJECTION INTRAMUSCULAR; INTRAVENOUS ONCE
Status: COMPLETED | OUTPATIENT
Start: 2022-01-01 | End: 2022-01-01

## 2022-01-01 RX ORDER — POTASSIUM CHLORIDE 14.9 MG/ML
20 INJECTION INTRAVENOUS
Status: COMPLETED | OUTPATIENT
Start: 2022-01-01 | End: 2022-01-01

## 2022-01-01 RX ORDER — DIGOXIN 0.25 MG/ML
125 INJECTION INTRAMUSCULAR; INTRAVENOUS DAILY
Status: DISCONTINUED | OUTPATIENT
Start: 2022-01-01 | End: 2022-01-01

## 2022-01-01 RX ORDER — HYDRALAZINE HYDROCHLORIDE 25 MG/1
25 TABLET, FILM COATED ORAL EVERY 8 HOURS SCHEDULED
Status: DISCONTINUED | OUTPATIENT
Start: 2022-01-01 | End: 2022-01-01

## 2022-01-01 RX ORDER — HYDRALAZINE HYDROCHLORIDE 20 MG/ML
5 INJECTION INTRAMUSCULAR; INTRAVENOUS ONCE
Status: COMPLETED | OUTPATIENT
Start: 2022-01-01 | End: 2022-01-01

## 2022-01-01 RX ORDER — DIGOXIN 0.25 MG/ML
250 INJECTION INTRAMUSCULAR; INTRAVENOUS EVERY 4 HOURS
Status: COMPLETED | OUTPATIENT
Start: 2022-01-01 | End: 2022-01-01

## 2022-01-01 RX ORDER — HYDRALAZINE HYDROCHLORIDE 50 MG/1
50 TABLET, FILM COATED ORAL EVERY 8 HOURS SCHEDULED
Status: DISCONTINUED | OUTPATIENT
Start: 2022-01-01 | End: 2022-01-01

## 2022-01-01 RX ADMIN — DIGOXIN 125 MCG: 0.25 INJECTION INTRAMUSCULAR; INTRAVENOUS at 13:13

## 2022-01-01 RX ADMIN — ISOSORBIDE DINITRATE 20 MG: 10 TABLET ORAL at 17:17

## 2022-01-01 RX ADMIN — MAGNESIUM SULFATE HEPTAHYDRATE 2 G: 40 INJECTION, SOLUTION INTRAVENOUS at 10:47

## 2022-01-01 RX ADMIN — MAGNESIUM SULFATE HEPTAHYDRATE 2 G: 40 INJECTION, SOLUTION INTRAVENOUS at 08:00

## 2022-01-01 RX ADMIN — MORPHINE SULFATE 2 MG: 2 INJECTION, SOLUTION INTRAMUSCULAR; INTRAVENOUS at 09:43

## 2022-01-01 RX ADMIN — LORAZEPAM 0.5 MG: 2 INJECTION INTRAMUSCULAR; INTRAVENOUS at 17:16

## 2022-01-01 RX ADMIN — ISOSORBIDE DINITRATE 20 MG: 10 TABLET ORAL at 13:11

## 2022-01-01 RX ADMIN — POTASSIUM CHLORIDE 20 MEQ: 14.9 INJECTION, SOLUTION INTRAVENOUS at 06:58

## 2022-01-01 RX ADMIN — NITROGLYCERIN 80 MCG/MIN: 20 INJECTION INTRAVENOUS at 06:09

## 2022-01-01 RX ADMIN — FUROSEMIDE 80 MG: 10 INJECTION, SOLUTION INTRAMUSCULAR; INTRAVENOUS at 17:16

## 2022-01-01 RX ADMIN — MORPHINE SULFATE 2 MG: 2 INJECTION, SOLUTION INTRAMUSCULAR; INTRAVENOUS at 04:57

## 2022-01-01 RX ADMIN — MORPHINE SULFATE 2 MG: 2 INJECTION, SOLUTION INTRAMUSCULAR; INTRAVENOUS at 22:23

## 2022-01-01 RX ADMIN — HEPARIN SODIUM 18 UNITS/KG/HR: 10000 INJECTION, SOLUTION INTRAVENOUS at 04:00

## 2022-01-01 RX ADMIN — FUROSEMIDE 80 MG: 10 INJECTION, SOLUTION INTRAMUSCULAR; INTRAVENOUS at 16:17

## 2022-01-01 RX ADMIN — LORAZEPAM 0.5 MG: 2 INJECTION INTRAMUSCULAR; INTRAVENOUS at 11:55

## 2022-01-01 RX ADMIN — HEPARIN SODIUM 3900 UNITS: 1000 INJECTION INTRAVENOUS; SUBCUTANEOUS at 23:22

## 2022-01-01 RX ADMIN — NITROGLYCERIN 185 MCG/MIN: 20 INJECTION INTRAVENOUS at 01:41

## 2022-01-01 RX ADMIN — NITROGLYCERIN 140 MCG/MIN: 20 INJECTION INTRAVENOUS at 10:53

## 2022-01-01 RX ADMIN — HEPARIN SODIUM 16 UNITS/KG/HR: 10000 INJECTION, SOLUTION INTRAVENOUS at 10:54

## 2022-01-01 RX ADMIN — HEPARIN SODIUM 1950 UNITS: 1000 INJECTION INTRAVENOUS; SUBCUTANEOUS at 12:35

## 2022-01-01 RX ADMIN — NITROGLYCERIN 140 MCG/MIN: 20 INJECTION INTRAVENOUS at 10:57

## 2022-01-01 RX ADMIN — MORPHINE SULFATE 2 MG: 2 INJECTION, SOLUTION INTRAMUSCULAR; INTRAVENOUS at 11:08

## 2022-01-01 RX ADMIN — POTASSIUM CHLORIDE 40 MEQ: 1500 TABLET, EXTENDED RELEASE ORAL at 17:16

## 2022-01-01 RX ADMIN — DIGOXIN 125 MCG: 0.25 INJECTION INTRAMUSCULAR; INTRAVENOUS at 08:24

## 2022-01-01 RX ADMIN — NITROGLYCERIN 165 MCG/MIN: 20 INJECTION INTRAVENOUS at 20:53

## 2022-01-01 RX ADMIN — MORPHINE SULFATE 2 MG: 2 INJECTION, SOLUTION INTRAMUSCULAR; INTRAVENOUS at 13:00

## 2022-01-01 RX ADMIN — DIGOXIN 250 MCG: 250 INJECTION, SOLUTION INTRAMUSCULAR; INTRAVENOUS; PARENTERAL at 02:50

## 2022-01-01 RX ADMIN — LORAZEPAM 0.5 MG: 2 INJECTION INTRAMUSCULAR; INTRAVENOUS at 14:53

## 2022-01-01 RX ADMIN — NITROGLYCERIN 70 MCG/MIN: 20 INJECTION INTRAVENOUS at 06:58

## 2022-01-01 RX ADMIN — FUROSEMIDE 80 MG: 10 INJECTION, SOLUTION INTRAMUSCULAR; INTRAVENOUS at 08:01

## 2022-01-01 RX ADMIN — LABETALOL HYDROCHLORIDE 10 MG: 5 INJECTION, SOLUTION INTRAVENOUS at 10:33

## 2022-01-01 RX ADMIN — HYDRALAZINE HYDROCHLORIDE 50 MG: 50 TABLET, FILM COATED ORAL at 13:21

## 2022-01-01 RX ADMIN — HEPARIN SODIUM 12 UNITS/KG/HR: 10000 INJECTION, SOLUTION INTRAVENOUS at 23:21

## 2022-01-01 RX ADMIN — HEPARIN SODIUM 18 UNITS/KG/HR: 10000 INJECTION, SOLUTION INTRAVENOUS at 08:35

## 2022-01-01 RX ADMIN — DIGOXIN 125 MCG: 0.25 INJECTION INTRAMUSCULAR; INTRAVENOUS at 09:25

## 2022-01-01 RX ADMIN — HYDRALAZINE HYDROCHLORIDE 50 MG: 50 TABLET, FILM COATED ORAL at 06:52

## 2022-01-01 RX ADMIN — HEPARIN SODIUM 18 UNITS/KG/HR: 10000 INJECTION, SOLUTION INTRAVENOUS at 04:26

## 2022-01-01 RX ADMIN — LORAZEPAM 0.5 MG: 2 INJECTION INTRAMUSCULAR; INTRAVENOUS at 19:43

## 2022-01-01 RX ADMIN — ISOSORBIDE DINITRATE 10 MG: 10 TABLET ORAL at 13:18

## 2022-01-01 RX ADMIN — NITROGLYCERIN 50 MCG/MIN: 20 INJECTION INTRAVENOUS at 13:23

## 2022-01-01 RX ADMIN — MORPHINE SULFATE 2 MG: 2 INJECTION, SOLUTION INTRAMUSCULAR; INTRAVENOUS at 14:11

## 2022-01-01 RX ADMIN — FUROSEMIDE 40 MG: 10 INJECTION, SOLUTION INTRAMUSCULAR; INTRAVENOUS at 10:53

## 2022-01-01 RX ADMIN — NITROGLYCERIN 200 MCG/MIN: 20 INJECTION INTRAVENOUS at 19:23

## 2022-01-01 RX ADMIN — POTASSIUM CHLORIDE 20 MEQ: 14.9 INJECTION, SOLUTION INTRAVENOUS at 09:36

## 2022-01-01 RX ADMIN — FUROSEMIDE 80 MG: 10 INJECTION, SOLUTION INTRAMUSCULAR; INTRAVENOUS at 07:58

## 2022-01-01 RX ADMIN — MORPHINE SULFATE 2 MG: 2 INJECTION, SOLUTION INTRAMUSCULAR; INTRAVENOUS at 19:43

## 2022-01-01 RX ADMIN — DILTIAZEM HYDROCHLORIDE 15 MG: 5 INJECTION INTRAVENOUS at 11:49

## 2022-01-01 RX ADMIN — DIGOXIN 250 MCG: 0.25 INJECTION INTRAMUSCULAR; INTRAVENOUS at 18:09

## 2022-01-01 RX ADMIN — ISOSORBIDE DINITRATE 20 MG: 20 TABLET ORAL at 06:52

## 2022-01-01 RX ADMIN — CARVEDILOL 3.12 MG: 3.12 TABLET, FILM COATED ORAL at 15:27

## 2022-01-01 RX ADMIN — ISOSORBIDE DINITRATE 30 MG: 30 TABLET ORAL at 13:13

## 2022-01-01 RX ADMIN — FUROSEMIDE 40 MG: 10 INJECTION, SOLUTION INTRAMUSCULAR; INTRAVENOUS at 11:21

## 2022-01-01 RX ADMIN — MORPHINE SULFATE 2 MG: 2 INJECTION, SOLUTION INTRAMUSCULAR; INTRAVENOUS at 02:30

## 2022-01-01 RX ADMIN — IOHEXOL 100 ML: 350 INJECTION, SOLUTION INTRAVENOUS at 15:04

## 2022-01-01 RX ADMIN — NITROGLYCERIN 200 MCG/MIN: 20 INJECTION INTRAVENOUS at 10:45

## 2022-01-01 RX ADMIN — ISOSORBIDE DINITRATE 30 MG: 30 TABLET ORAL at 21:37

## 2022-01-01 RX ADMIN — HYDRALAZINE HYDROCHLORIDE 50 MG: 50 TABLET, FILM COATED ORAL at 21:37

## 2022-01-01 RX ADMIN — SODIUM CHLORIDE, SODIUM GLUCONATE, SODIUM ACETATE, POTASSIUM CHLORIDE, MAGNESIUM CHLORIDE, SODIUM PHOSPHATE, DIBASIC, AND POTASSIUM PHOSPHATE 250 ML: .53; .5; .37; .037; .03; .012; .00082 INJECTION, SOLUTION INTRAVENOUS at 13:00

## 2022-01-01 RX ADMIN — HYDRALAZINE HYDROCHLORIDE 5 MG: 20 INJECTION, SOLUTION INTRAMUSCULAR; INTRAVENOUS at 00:16

## 2022-01-01 RX ADMIN — FUROSEMIDE 40 MG: 10 INJECTION, SOLUTION INTRAMUSCULAR; INTRAVENOUS at 04:13

## 2022-01-01 RX ADMIN — FUROSEMIDE 20 MG: 10 INJECTION, SOLUTION INTRAMUSCULAR; INTRAVENOUS at 13:22

## 2022-01-01 RX ADMIN — HYDRALAZINE HYDROCHLORIDE 10 MG: 20 INJECTION, SOLUTION INTRAMUSCULAR; INTRAVENOUS at 08:15

## 2022-01-01 RX ADMIN — DILTIAZEM HYDROCHLORIDE 5 MG/HR: 5 INJECTION INTRAVENOUS at 13:15

## 2022-01-01 RX ADMIN — HYDRALAZINE HYDROCHLORIDE 25 MG: 25 TABLET, FILM COATED ORAL at 21:35

## 2022-01-01 RX ADMIN — POTASSIUM CHLORIDE 40 MEQ: 1500 TABLET, EXTENDED RELEASE ORAL at 19:50

## 2022-01-01 RX ADMIN — CARVEDILOL 3.12 MG: 3.12 TABLET, FILM COATED ORAL at 06:52

## 2022-01-01 RX ADMIN — NITROGLYCERIN 90 MCG/MIN: 20 INJECTION INTRAVENOUS at 00:20

## 2022-01-01 RX ADMIN — POTASSIUM CHLORIDE 20 MEQ: 14.9 INJECTION, SOLUTION INTRAVENOUS at 11:47

## 2022-01-01 RX ADMIN — NITROGLYCERIN 165 MCG/MIN: 20 INJECTION INTRAVENOUS at 15:57

## 2022-01-01 RX ADMIN — NITROGLYCERIN 195 MCG/MIN: 20 INJECTION INTRAVENOUS at 15:10

## 2022-01-01 RX ADMIN — HYDRALAZINE HYDROCHLORIDE 10 MG: 20 INJECTION, SOLUTION INTRAMUSCULAR; INTRAVENOUS at 17:16

## 2022-01-01 RX ADMIN — HEPARIN SODIUM 16 UNITS/KG/HR: 10000 INJECTION, SOLUTION INTRAVENOUS at 10:57

## 2022-01-01 RX ADMIN — MORPHINE SULFATE 2 MG: 2 INJECTION, SOLUTION INTRAMUSCULAR; INTRAVENOUS at 06:37

## 2022-01-01 RX ADMIN — POTASSIUM CHLORIDE 40 MEQ: 1500 TABLET, EXTENDED RELEASE ORAL at 22:31

## 2022-01-01 RX ADMIN — POTASSIUM CHLORIDE 20 MEQ: 14.9 INJECTION, SOLUTION INTRAVENOUS at 09:02

## 2022-01-01 RX ADMIN — MAGNESIUM SULFATE HEPTAHYDRATE 2 G: 40 INJECTION, SOLUTION INTRAVENOUS at 09:36

## 2022-01-01 RX ADMIN — DIGOXIN 250 MCG: 250 INJECTION, SOLUTION INTRAMUSCULAR; INTRAVENOUS; PARENTERAL at 06:36

## 2022-01-01 RX ADMIN — NITROGLYCERIN 190 MCG/MIN: 20 INJECTION INTRAVENOUS at 06:35

## 2022-01-01 RX ADMIN — CARVEDILOL 3.12 MG: 3.12 TABLET, FILM COATED ORAL at 21:35

## 2022-01-01 RX ADMIN — HYDRALAZINE HYDROCHLORIDE 5 MG: 20 INJECTION, SOLUTION INTRAMUSCULAR; INTRAVENOUS at 23:06

## 2022-01-01 RX ADMIN — NITROGLYCERIN 0.4 MG: 0.4 TABLET SUBLINGUAL at 04:34

## 2022-01-01 RX ADMIN — MORPHINE SULFATE 2 MG: 2 INJECTION, SOLUTION INTRAMUSCULAR; INTRAVENOUS at 01:00

## 2022-01-01 RX ADMIN — DIGOXIN 250 MCG: 250 INJECTION, SOLUTION INTRAMUSCULAR; INTRAVENOUS; PARENTERAL at 23:41

## 2022-01-01 RX ADMIN — FUROSEMIDE 60 MG: 10 INJECTION, SOLUTION INTRAMUSCULAR; INTRAVENOUS at 09:04

## 2022-01-01 RX ADMIN — POTASSIUM CHLORIDE 40 MEQ: 1500 TABLET, EXTENDED RELEASE ORAL at 06:53

## 2022-01-01 RX ADMIN — LABETALOL HYDROCHLORIDE 10 MG: 5 INJECTION, SOLUTION INTRAVENOUS at 21:35

## 2022-01-01 RX ADMIN — HEPARIN SODIUM 3900 UNITS: 1000 INJECTION INTRAVENOUS; SUBCUTANEOUS at 06:05

## 2022-01-01 RX ADMIN — HEPARIN SODIUM 1950 UNITS: 1000 INJECTION INTRAVENOUS; SUBCUTANEOUS at 07:58

## 2022-01-01 RX ADMIN — MORPHINE SULFATE 2 MG: 2 INJECTION, SOLUTION INTRAMUSCULAR; INTRAVENOUS at 14:52

## 2022-01-01 RX ADMIN — POTASSIUM CHLORIDE 40 MEQ: 1500 TABLET, EXTENDED RELEASE ORAL at 18:10

## 2022-01-01 RX ADMIN — FUROSEMIDE 80 MG: 10 INJECTION, SOLUTION INTRAMUSCULAR; INTRAVENOUS at 15:15

## 2022-01-01 RX ADMIN — DILTIAZEM HYDROCHLORIDE 15 MG/HR: 5 INJECTION INTRAVENOUS at 23:14

## 2022-01-01 RX ADMIN — MORPHINE SULFATE 2 MG: 2 INJECTION, SOLUTION INTRAMUSCULAR; INTRAVENOUS at 17:16

## 2022-01-01 RX ADMIN — ISOSORBIDE DINITRATE 10 MG: 10 TABLET ORAL at 18:09

## 2022-11-11 NOTE — ED NOTES
Per provider, keep cardizem infusing at 15mg/hr     Keyla Blackmon, [facility]  11/11/22 2812

## 2022-11-11 NOTE — ED NOTES
Xray at bedside     [facility]  11/11/22 4001

## 2022-11-11 NOTE — ED PROVIDER NOTES
History  Chief Complaint   Patient presents with   • Weakness - Generalized     Weakness beginning in the beginning of September, since has BLE edema, worse in the R leg  Loss of appetite, Significant weight loss  Has not seen a doctor since 5      66 year old female with PMH tobacco abuse presents ambulatory from home accompanied by family for further evaluation  Pt states she is only here because her family made her come  She has no established past medical history  She has been having generalized weakness over the past few months  She has noticed leg swelling  She has had very little to no appetite  Her urine output has been diminished  Family notes weight loss over the past few weeks  Pt has not seen a physician or medical provider in several decades  Daughter notes she hasn't been to a doctor since 1977 when she was born  She denies any home medications  She takes OTC ibuprofen as needed on occasion  She reports she quit smoking in September of this year  She admits to SOB  She admits to a cough which has been ongoing  Denies fever, chills  Denies chest pain  Denies N/V/D, abdominal pain  No reported aggravating or alleviating factors  No specific treatments tried  No prior evaluation  History provided by:  Patient and relative   used: No        None       History reviewed  No pertinent past medical history  Past Surgical History:   Procedure Laterality Date   • APPENDECTOMY     • TONSILLECTOMY     • WRIST SURGERY         History reviewed  No pertinent family history  I have reviewed and agree with the history as documented  E-Cigarette/Vaping     E-Cigarette/Vaping Substances     Social History     Tobacco Use   • Smoking status: Current Every Day Smoker   • Smokeless tobacco: Never Used   Substance Use Topics   • Drug use: Never       Review of Systems   Constitutional: Positive for appetite change, fatigue and unexpected weight change   Negative for chills and fever  HENT: Negative  Negative for congestion, rhinorrhea and sore throat  Eyes: Negative  Negative for visual disturbance  Respiratory: Positive for cough  Negative for shortness of breath and wheezing  Cardiovascular: Positive for leg swelling  Negative for chest pain and palpitations  Gastrointestinal: Negative  Negative for abdominal pain, constipation, diarrhea, nausea and vomiting  Genitourinary: Positive for decreased urine volume  Negative for dysuria, flank pain, frequency and hematuria  Musculoskeletal: Negative  Negative for back pain and neck pain  Skin: Negative  Negative for rash  Neurological: Negative  Negative for dizziness, syncope, light-headedness and headaches  Psychiatric/Behavioral: Negative  Negative for confusion  All other systems reviewed and are negative  Physical Exam  Physical Exam  Vitals and nursing note reviewed  Constitutional:       General: She is awake  She is not in acute distress  Appearance: She is well-developed  She is ill-appearing  Interventions: Nasal cannula in place  HENT:      Head: Normocephalic and atraumatic  Right Ear: Hearing and external ear normal       Left Ear: Hearing and external ear normal       Nose: Nose normal       Mouth/Throat:      Mouth: Mucous membranes are dry  Tongue: Tongue does not deviate from midline  Pharynx: Oropharynx is clear  Uvula midline  Eyes:      General: Lids are normal  No scleral icterus  Conjunctiva/sclera: Conjunctivae normal       Pupils: Pupils are equal, round, and reactive to light  Neck:      Trachea: Trachea and phonation normal  No tracheal deviation  Cardiovascular:      Rate and Rhythm: Tachycardia present  Rhythm regularly irregular  Pulses: Normal pulses  Radial pulses are 2+ on the right side and 2+ on the left side  Dorsalis pedis pulses are 2+ on the right side and 2+ on the left side          Posterior tibial pulses are 2+ on the right side and 2+ on the left side  Heart sounds: Normal heart sounds, S1 normal and S2 normal    Pulmonary:      Effort: Tachypnea present  No respiratory distress  Breath sounds: Rales present  No wheezing or rhonchi  Comments: +coughing noted  Abdominal:      General: Bowel sounds are normal  There is distension  Palpations: Abdomen is soft  Tenderness: There is no abdominal tenderness  There is no guarding or rebound  Hernia: A hernia is present  Hernia is present in the ventral area  Musculoskeletal:      Cervical back: Normal range of motion and neck supple  Right lower leg: Edema present  Left lower leg: Edema present  Comments: R>L lower extremity pitting edema   Skin:     General: Skin is warm and dry  Capillary Refill: Capillary refill takes 2 to 3 seconds  Findings: No rash  Neurological:      General: No focal deficit present  Mental Status: She is alert and oriented to person, place, and time  GCS: GCS eye subscore is 4  GCS verbal subscore is 5  GCS motor subscore is 6  Cranial Nerves: No cranial nerve deficit  Sensory: No sensory deficit  Motor: No abnormal muscle tone     Psychiatric:         Mood and Affect: Mood normal          Speech: Speech normal          Behavior: Behavior normal          Vital Signs  ED Triage Vitals   Temperature Pulse Respirations Blood Pressure SpO2   11/11/22 1049 11/11/22 1049 11/11/22 1049 11/11/22 1049 11/11/22 1049   98 2 °F (36 8 °C) (!) 134 (!) 35 161/98 (!) 81 %      Temp Source Heart Rate Source Patient Position - Orthostatic VS BP Location FiO2 (%)   11/11/22 1049 11/11/22 1049 11/11/22 1049 11/11/22 1049 --   Temporal Monitor Lying Right arm       Pain Score       11/11/22 1100       No Pain           Vitals:    11/11/22 1845 11/11/22 1915 11/11/22 1930 11/11/22 2100   BP: 135/64 115/68 140/65 161/95   Pulse: (!) 121 (!) 120 98 (!) 115   Patient Position - Orthostatic VS: Sitting Sitting Sitting Lying         Visual Acuity      ED Medications  Medications   metoprolol (LOPRESSOR) injection 5 mg (0 mg Intravenous Hold 11/11/22 1940)   heparin (porcine) 25,000 units in 0 45% NaCl 250 mL infusion (premix) (12 Units/kg/hr × 65 kg (Order-Specific) Intravenous New Bag 11/11/22 2321)   heparin (porcine) injection 3,900 Units (has no administration in time range)   heparin (porcine) injection 1,950 Units (has no administration in time range)   digoxin (LANOXIN) injection 250 mcg (has no administration in time range)   furosemide (LASIX) injection 40 mg (40 mg Intravenous Given 11/11/22 1121)   diltiazem (CARDIZEM) injection 15 mg (15 mg Intravenous Given 11/11/22 1149)   multi-electrolyte (ISOLYTE-S PH 7 4) bolus 250 mL (0 mL Intravenous Stopped 11/11/22 1400)   diltiazem (CARDIZEM) 125 mg in sodium chloride 0 9 % 125 mL infusion (15 mg/hr Intravenous New Bag 11/11/22 2314)   iohexol (OMNIPAQUE) 350 MG/ML injection (SINGLE-DOSE) 100 mL (100 mL Intravenous Given 11/11/22 1504)   digoxin (LANOXIN) injection 250 mcg (250 mcg Intravenous Given 11/11/22 1809)   heparin (porcine) injection 3,900 Units (3,900 Units Intravenous Given 11/11/22 2322)       Diagnostic Studies  Results Reviewed     Procedure Component Value Units Date/Time    APTT six (6) hours after Heparin bolus/drip initiation or dosing change [[id number]] Collected: 11/11/22 2328    Lab Status: No result Specimen: Blood from Arm, Right     CBC [[id number]] Collected: 11/11/22 2328    Lab Status: No result Specimen: Blood from Arm, Right     Protime-INR [[id number]] Collected: 11/11/22 2328    Lab Status: No result Specimen: Blood from Arm, Right     Blood culture #1 [[id number]] Collected: 11/11/22 1119    Lab Status: Preliminary result Specimen: Blood from Arm, Right Updated: 11/11/22 1904     Blood Culture Received in Microbiology Lab  Culture in Progress      Blood culture #2 [[id number]] Collected: 11/11/22 1119    Lab Status: Preliminary result Specimen: Blood from Arm, Left Updated: 11/11/22 1904     Blood Culture Received in Microbiology Lab  Culture in Progress  HS Troponin I 4hr [[id number]]  (Abnormal) Collected: 11/11/22 1514    Lab Status: Final result Specimen: Blood from Arm, Right Updated: 11/11/22 1547     hs TnI 4hr 61 ng/L      Delta 4hr hsTnI -21 ng/L     HS Troponin I 2hr [[id number]]  (Abnormal) Collected: 11/11/22 1303    Lab Status: Final result Specimen: Blood from Arm, Right Updated: 11/11/22 1339     hs TnI 2hr 82 ng/L      Delta 2hr hsTnI 0 ng/L     Lactic acid 2 Hours [[id number]]  (Normal) Collected: 11/11/22 1304    Lab Status: Final result Specimen: Blood from Arm, Right Updated: 11/11/22 1333     LACTIC ACID 1 4 mmol/L     Narrative:      Result may be elevated if tourniquet was used during collection  Urine Microscopic [[id number]]  (Normal) Collected: 11/11/22 1303    Lab Status: Final result Specimen: Urine, Clean Catch Updated: 11/11/22 1320     RBC, UA 2-4 /hpf      WBC, UA 1-2 /hpf      Epithelial Cells Occasional /hpf      Bacteria, UA Occasional /hpf     UA w Reflex to Microscopic w Reflex to Culture [[id number]]  (Abnormal) Collected: 11/11/22 1303    Lab Status: Final result Specimen: Urine, Clean Catch Updated: 11/11/22 1316     Color, UA Yellow     Clarity, UA Clear     Specific Gravity, UA 1 015     pH, UA 5 5     Leukocytes, UA Negative     Nitrite, UA Negative     Protein, UA Trace mg/dl      Glucose, UA Negative mg/dl      Ketones, UA Negative mg/dl      Urobilinogen, UA 1 0 E U /dl      Bilirubin, UA Negative     Occult Blood, UA Small    Procalcitonin [[id number]]  (Normal) Collected: 11/11/22 1056    Lab Status: Final result Specimen: Blood Updated: 11/11/22 1230     Procalcitonin 0 19 ng/ml     D-Dimer [[id number]]  (Abnormal) Collected: 11/11/22 1056    Lab Status: Final result Specimen: Blood from Arm, Right Updated: 11/11/22 1215     D-Dimer, Quant 7 46 ug/ml FEU     Narrative:       In the evaluation for possible pulmonary embolism, in the appropriate (Well's Score of 4 or less) patient, the age adjusted d-dimer cutoff for this patient can be calculated as:    Age x 0 01 (in ug/mL) for Age-adjusted D-dimer exclusion threshold for a patient over 50 years  Protime-INR [[id number]]  (Abnormal) Collected: 11/11/22 1056    Lab Status: Final result Specimen: Blood from Arm, Right Updated: 11/11/22 1210     Protime 15 6 seconds      INR 1 22    APTT [[id number]]  (Normal) Collected: 11/11/22 1056    Lab Status: Final result Specimen: Blood from Arm, Right Updated: 11/11/22 1210     PTT 26 seconds     TSH, 3rd generation with Free T4 reflex [[id number]]  (Normal) Collected: 11/11/22 1056    Lab Status: Final result Specimen: Blood Updated: 11/11/22 1208     TSH 3RD GENERATON 3 377 uIU/mL     Narrative:      Patients undergoing fluorescein dye angiography may retain small amounts of fluorescein in the body for 48-72 hours post procedure  Samples containing fluorescein can produce falsely depressed TSH values  If the patient had this procedure,a specimen should be resubmitted post fluorescein clearance  Magnesium [[id number]]  (Normal) Collected: 11/11/22 1056    Lab Status: Final result Specimen: Blood Updated: 11/11/22 1208     Magnesium 2 0 mg/dL     FLU/RSV/COVID - if FLU/RSV clinically relevant [[id number]]  (Normal) Collected: 11/11/22 1119    Lab Status: Final result Specimen: Nasopharyngeal Swab Updated: 11/11/22 1205     SARS-CoV-2 Negative     INFLUENZA A PCR Negative     INFLUENZA B PCR Negative     RSV PCR Negative    Narrative:      FOR PEDIATRIC PATIENTS - copy/paste COVID Guidelines URL to browser: [email or web address]  [email or web address]    SARS-CoV-2 assay is a Nucleic Acid Amplification assay intended for the  qualitative detection of nucleic acid from SARS-CoV-2 in nasopharyngeal  swabs   Results are for the presumptive identification of SARS-CoV-2 RNA     Positive results are indicative of infection with SARS-CoV-2, the virus  causing COVID-19, but do not rule out bacterial infection or co-infection  with other viruses  Laboratories within the [place] and its  territories are required to report all positive results to the appropriate  public health authorities  Negative results do not preclude SARS-CoV-2  infection and should not be used as the sole basis for treatment or other  patient management decisions  Negative results must be combined with  clinical observations, patient history, and epidemiological information  This test has not been FDA cleared or approved  This test has been authorized by FDA under an Emergency Use Authorization  (EUA)  This test is only authorized for the duration of time the  declaration that circumstances exist justifying the authorization of the  emergency use of an in vitro diagnostic tests for detection of SARS-CoV-2  virus and/or diagnosis of COVID-19 infection under section 564(b)(1) of  the Act, 21 U  S C  165CWA-4(K)(6), unless the authorization is terminated  or revoked sooner  The test has been validated but independent review by FDA  and CLIA is pending  Test performed using [employer] Gene[employer]pert: This RT-PCR assay targets N2,  a region unique to SARS-CoV-2  A conserved region in the E-gene was chosen  for pan-Sarbecovirus detection which includes SARS-CoV-2  According to CMS-2020-01-R, this platform meets the definition of high-throughput technology  Lactic acid [[id number]]  (Abnormal) Collected: 11/11/22 1119    Lab Status: Final result Specimen: Blood from Arm, Left Updated: 11/11/22 1155     LACTIC ACID 2 2 mmol/L     Narrative:      Result may be elevated if tourniquet was used during collection      HS Troponin 0hr (reflex protocol) [[id number]]  (Abnormal) Collected: 11/11/22 1056    Lab Status: Final result Specimen: Blood from Arm, Right Updated: 11/11/22 1135     hs TnI 0hr 82 ng/L Comprehensive metabolic panel [[id number]]  (Abnormal) Collected: 11/11/22 1056    Lab Status: Final result Specimen: Blood from Arm, Right Updated: 11/11/22 1134     Sodium 138 mmol/L      Potassium 4 9 mmol/L      Chloride 98 mmol/L      CO2 36 mmol/L      ANION GAP 4 mmol/L      BUN 46 mg/dL      Creatinine 1 15 mg/dL      Glucose 138 mg/dL      Calcium 9 4 mg/dL      Corrected Calcium 10 4 mg/dL      AST --      U/L      Alkaline Phosphatase 84 U/L      Total Protein 6 7 g/dL      Albumin 2 8 g/dL      Total Bilirubin 1 72 mg/dL      eGFR 47 ml/min/1 73sq m     Narrative:      [place] guidelines for Chronic Kidney Disease (CKD):   •  Stage 1 with normal or high GFR (GFR > 90 mL/min/1 73 square meters)  •  Stage 2 Mild CKD (GFR = 60-89 mL/min/1 73 square meters)  •  Stage 3A Moderate CKD (GFR = 45-59 mL/min/1 73 square meters)  •  Stage 3B Moderate CKD (GFR = 30-44 mL/min/1 73 square meters)  •  Stage 4 Severe CKD (GFR = 15-29 mL/min/1 73 square meters)  •  Stage 5 End Stage CKD (GFR <15 mL/min/1 73 square meters)  Note: GFR calculation is accurate only with a steady state creatinine    NT-BNP PRO [[id number]]  (Abnormal) Collected: 11/11/22 1056    Lab Status: Final result Specimen: Blood from Arm, Right Updated: 11/11/22 1133     NT-proBNP 20,923 pg/mL     CBC and differential [[id number]]  (Abnormal) Collected: 11/11/22 1056    Lab Status: Final result Specimen: Blood from Arm, Right Updated: 11/11/22 1112     WBC 7 24 Thousand/uL      RBC 4 67 Million/uL      Hemoglobin 14 2 g/dL      Hematocrit 44 6 %      MCV 96 fL      MCH 30 4 pg      MCHC 31 8 g/dL      RDW 15 7 %      MPV 11 6 fL      Platelets 678 Thousands/uL      nRBC 0 /100 WBCs      Neutrophils Relative 87 %      Immat GRANS % 0 %      Lymphocytes Relative 5 %      Monocytes Relative 8 %      Eosinophils Relative 0 %      Basophils Relative 0 %      Neutrophils Absolute 6 22 Thousands/µL      Immature Grans Absolute 0 02 Thousand/uL      Lymphocytes Absolute 0 39 Thousands/µL      Monocytes Absolute 0 58 Thousand/µL      Eosinophils Absolute 0 01 Thousand/µL      Basophils Absolute 0 02 Thousands/µL                  CT pe study w abdomen pelvis w contrast   Final Result by Sherine Joy MD (11/11 1534)      No pulmonary arterial embolism  At least moderate CHF  Small bilateral pleural effusions  Complex 3 3 cm right lower pole renal mass suspicious for renal cell carcinoma  Indeterminate focal area of right hepatic enhancement measuring 10 mm  Hypoenhancing right kidney secondary to advanced renal vascular disease  Advanced atherosclerotic changes; infrarenal abdominal aortic aneurysm measuring 4 5 cm with aortic stenosis and focal narrowing of the infrarenal aorta lumen to 10 mm  Large bowel containing ventral hernia  The study was marked in [facility] for immediate notification  Workstation performed: [id number]         VAS lower limb venous duplex study, complete bilateral   Final Result by Lucie Florian MD (11/11 1226)      XR chest 1 view portable   Final Result by Tom Palma MD (11/11 1133)      Moderate-sized left pleural effusion  Note is made of patient's smoking history    An underlying lesion or consolidation cannot be excluded on this single frontal radiograph of the chest                   Workstation performed: [id number]                    Procedures  ECG 12 Lead Documentation Only    Date/Time: 11/11/2022 10:58 AM  Performed by: Rosa Stapleton PA-C  Authorized by: Rosa Stapleton PA-C     Indications / Diagnosis:  Dyspnea, tachycardia  ECG reviewed by me, the ED Provider: yes    Patient location:  ED  Previous ECG:     Previous ECG:  Unavailable    Comparison to cardiac monitor: Yes    Interpretation:     Interpretation: abnormal    Rate:     ECG rate:  135  Rhythm:     Rhythm: atrial flutter    ST segments:     ST segments:  Abnormal  T waves:     T waves: non-specific and inverted      Inverted:  V5 and V6  Other findings:     Other findings: LVH    Comments:      MI*, , QT/QTc 280/420; no prior EKG for comparison  CriticalCare Time  Performed by: Cameron Miranda PA-C  Authorized by: Cameron Miranda PA-C     Critical care provider statement:     Critical care time (minutes):  50    Critical care time was exclusive of:  Separately billable procedures and treating other patients    Critical care was necessary to treat or prevent imminent or life-threatening deterioration of the following conditions:  Cardiac failure and respiratory failure    Critical care was time spent personally by me on the following activities:  Obtaining history from patient or surrogate, discussions with consultants, development of treatment plan with patient or surrogate, evaluation of patient's response to treatment, examination of patient, ordering and performing treatments and interventions, ordering and review of laboratory studies, ordering and review of radiographic studies, re-evaluation of patient's condition and review of old charts    I assumed direction of critical care for this patient from another provider in my specialty: no               ED Course  ED Course as of 11/11/22 2333   Fri Nov 11, 2022   1112 WBC: 7 24   1112 Hemoglobin: 14 2   1113 Platelet Count(!): 962  No prior for comparison   1135 XR chest 1 view portable  IMPRESSION:     Moderate-sized left pleural effusion  Note is made of patient's smoking history    An underlying lesion or consolidation cannot be excluded on this single frontal radiograph of the chest    1135 Glucose, Random: 138   1136 Creatinine: 1 15  No baseline for comparison   1136 BUN(!): 46   1136 Sodium: 138   1136 Potassium: 4 9   1136 Chloride: 98   1136 CO2(!): 36   1136 Anion Gap: 4   1136 CORRECTED CALCIUM(!): 10 4   1136 ALT(!): 289   1136 Alkaline Phosphatase: 84   1136 Total Protein: 6 7   1136 Albumin(!): 2 8   1136 TOTAL BILIRUBIN(!): 1 72   1136 eGFR: 47   1136 hs TnI 0hr(!): 82  No chest pain, likely related to heart rate  1136 NT-proBNP(!): 20,923  No prior for comparison; volume overloaded on exam concerning for CHF   1146 HR still elevated, aflutter rate of 130s, will give dose of cardizem  1156 LACTIC ACID(!!): 2 2  The 30ml/kg fluid bolus was not given to the patient due to: Concern for fluid/volume overload  The patient will be administered 250 ml of crystalloid fluid instead  Orders for this have been placed in Epic  The patient may receive additional colloid or crystalloid fluids thereafter based on clinical condition  Jennifer Samaria, [place]     6573 SARS-COV-2: Negative   1206 INFLU A PCR: Negative   1206 INFLU B PCR: Negative   1206 RSV PCR: Negative   200 Prelim from vascular tech, negative for DVT in b/l lower extremities  1209 TSH 3RD GENERATON: 3 377   1209 Magnesium: 2 0   1210 POCT INR(!): 1 22   1210 PROTIME(!): 15 6   1210 PTT: 26   1218 D-Dimer, Quant(!): 7 46  Will require CTA chest to exclude PE   1225 Pt and family updated on all results thus far  Pt agreeable to further evaluation with CT imaging  Pt resting comfortably  Respiratory status stable, O2 sat 97% on 2L NC  Remains tachycardic  1235 Procalcitonin: 0 19   1320 POCT URINE PROTEIN(!): Trace   1320 Blood, UA(!): Small   1320 UA not c/w infection   1333 LACTIC ACID: 1 4  normalized   1334 Still awaiting pt to go for CT scan  Delay secondary to concurrent trauma evaluations within the department  [address] hsTnI: 0   1419 No change in HR, maxed out on cardizem drip  [phone] awaiting CT scan, according to CT tech she is next   1502 Pt currently over at CT scan  1514 CT performed and pending interpretation     1538 CT pe study w abdomen pelvis w contrast  IMPRESSION:     No pulmonary arterial embolism      At least moderate CHF      Small bilateral pleural effusions      Complex 3 3 cm right lower pole renal mass suspicious for renal cell carcinoma  Indeterminate focal area of right hepatic enhancement measuring 10 mm      Hypoenhancing right kidney secondary to advanced renal vascular disease      Advanced atherosclerotic changes; infrarenal abdominal aortic aneurysm measuring 4 5 cm with aortic stenosis and focal narrowing of the infrarenal aorta lumen to 10 mm      Large bowel containing ventral hernia  1540 [facility]  [facility]   56 Spoke with Dr Kianna Hermosillo via telephone; transfer to [facility] advised for cardiology and EP availability  1626 TT to on call cardiology here at [facility] for further recommendations regarding rate control  1629 Per Dr Valeri Boxer, recommends loading dose of digoxin for aflutter  250 mcg every 4 hrs x 4 doses as long as normal renal function  1644 Pt sleeping at present  Daughter at bedside  Updated on plan of care  [address] with Zulma Conroy at [employer]; will reach out to [address] [place] [address] and get back to me  [address] with Dr Roxy Aggarwal of [address] [place] [address]  Recommends dose of beta blocker; cardioversion if unstable  If beta blocker does not help, recommends amiodarone 150 mg bolus, which can be repeated x1  Does not feel pt needs transfer to EP, but if SLIM still not comfortable, could transfer to any facility with ICU availability  Cardioversion if pt becomes unstable  1825 Has been about 20 minutes from digoxin dose without change  HR still 132  Will plan to give beta blocker as recommended by critical care  1908 Still awaiting beta blocker administration, HR currently [facility] currently 87  /32 systolically  Lopressor held at this time  2023 Spoke with Dr Alessia Hodge of  at [address] [place] [address]; no need for ICU or step down 1  Would recommend admission to [address] [place] [address]  No cardiology at [place] over the weekend, recommends [facility] or other campus where cardiology would be available this weekend     2034 Spoke with Dr Kianna Hermosillo again; if no need for EP, can admit here and deal with transfer later if needed  2052 Spoke with Dr Bernabe Hurd at [facility]; recommends transfer to [facility] as they have no urology over the weekend  And again feel pt should be somewhere where EP is available for consult as well as urology availability given renal mass and concern for renal cell CA    2112 HR currently 86   2126 Spoke with Dr eTrrence Matamoros at [facility], accepts SOB under Dr Mark Sexton attending  Step down 2, on tele, recommends starting heparin drip due to aflutter  2148 Per PACS, " no sd2 bed available for tonight  will be in queue for available bed  if any changes- call pac and will reassess/ reconference "   7814 Spoke with Dr Swati Mon; will consult while pt awaiting transfer  2225 Pt sleeping at present  HR currently around 117  Is due for 2nd dose of digoxin  2236 Sign out to overnight attending Dr Jonathan Malcolm pending transfer to [facility]  Our SLIM overnight Dr Swati Mon will be doing a consult this evening  Pt presented for evaluation of several concerns  First dyspnea, leg swelling  Pt was in rapid aflutter which was aggressively treated with cardizem gtt, digoxin  New onset CHF treated with IV diuretic  No PE on CTA chest   Venous duplexes negative for DVT  Requires supplemental oxygen due to hypoxia and therefore new respiratory failure  Pt's respiratory status improved on 2L NC  In addition, weight loss, poor appetite, generalized weakness  Renal mass noted on CT which is concerning for renal cell carcinoma  This was reviewed with pt and daughter and aware that further evaluation is required to make a definitive cancer diagnosis  Other labs abnormalities and incidentals as noted above  Given all the above abnormalities and exam findings, pt requires transfer for higher level of care and further evaluation/monitoring and work up  Pt and family in agreeance with transfer plan    After multiple conversations with inpatient colleagues at various campuses, decided pt would be best served at [facility] due to specialist availability and pt's needs  Due to capacity issues, pt will eventually be transferred when bed available  Pt stable at time of my departure from ED  Overnight physician updated on plan of care and SLIM consulted pending her transfer arrangements  HEART Risk Score    Flowsheet Row Most Recent Value   Heart Score Risk Calculator    History 1 Filed at: 11/11/2022 1307   ECG 1 Filed at: 11/11/2022 1307   Age 2 Filed at: 11/11/2022 1307   Risk Factors 1 Filed at: 11/11/2022 1307   Troponin 2 Filed at: 11/11/2022 1307   HEART Score 7 Filed at: 11/11/2022 1307                        SBIRT 20yo+    Flowsheet Row Most Recent Value   SBIRT (23 yo +)    In order to provide better care to our patients, we are screening all of our patients for alcohol and drug use  Would it be okay to ask you these screening questions? Yes Filed at: 11/11/2022 1101   Initial Alcohol Screen: US AUDIT-C     1  How often do you have a drink containing alcohol? 0 Filed at: 11/11/2022 1101   2  How many drinks containing alcohol do you have on a typical day you are drinking? 0 Filed at: 11/11/2022 1101   3a  Male UNDER 65: How often do you have five or more drinks on one occasion? 0 Filed at: 11/11/2022 1101   3b  FEMALE Any Age, or MALE 65+: How often do you have 4 or more drinks on one occassion? 0 Filed at: 11/11/2022 1101   Audit-C Score 0 Filed at: 11/11/2022 1101   SHELLI: How many times in the past year have you    Used an illegal drug or used a prescription medication for non-medical reasons?  Never Filed at: 11/11/2022 1101          Wells' Criteria for PE    Flowsheet Row Most Recent Value   Wells' Criteria for PE    Clinical signs and symptoms of DVT 3 Filed at: 11/11/2022 1307   PE is primary diagnosis or equally likely 3 Filed at: 11/11/2022 1307   HR >100 1 5 Filed at: 11/11/2022 1307   Immobilization at least 3 days or Surgery in the previous 4 weeks 0 Filed at: 11/11/2022 1307   Previous, objectively diagnosed PE or DVT 0 Filed at: 11/11/2022 1307   Hemoptysis 0 Filed at: 11/11/2022 1307   Malignancy with treatment within 6 months or palliative 0 Filed at: 11/11/2022 1307   Wells' Criteria Total 7 5 Filed at: 11/11/2022 1307                [facility]  Number of Diagnoses or Management Options  Acute respiratory failure with hypoxia [facility]): new and requires workup  Atrial flutter with rapid ventricular response [facility]): new and requires workup  Bilateral pleural effusion: new and requires workup  [facility]congest[facility] failure) [facility] ): new and requires workup  Elevated lactic acid level: new and requires workup  Elevated troponin: new and requires workup  Right renal mass: new and requires workup  Ventral hernia without obstruction or gangrene: new and requires workup     Amount and/or Complexity of Data Reviewed  Clinical lab tests: ordered and reviewed  Tests in the radiology section of CPT®: ordered and reviewed  Decide to obtain previous medical records or to obtain history from someone other than the patient: yes  Obtain history from someone other than the patient: yes  Review and summarize past medical records: yes  Discuss the patient with other providers: yes (SLIM, cardiology, critical care, attending)  Independent visualization of images, tracings, or specimens: yes    Risk of Complications, Morbidity, and/or Mortality  Presenting problems: high  Diagnostic procedures: high  Management options: high    Patient Progress  Patient progress: stable      Disposition  Final diagnoses:   CHF (congestive heart failure) ([facility] )   Acute respiratory failure with hypoxia (HCC)   Atrial flutter with rapid ventricular response (HCC)   Elevated troponin   Elevated lactic acid level   Bilateral pleural effusion   Right renal mass   Ventral hernia without obstruction or gangrene     Time reflects when diagnosis was documented in both MDM as applicable and the Disposition within this note     Time User Action Codes Description Comment    11/11/2022  4:41 PM Maryhelen Baba Add [I50 9] CHF (congestive heart failure) ([facility] )     11/11/2022  4:41 PM Maryhelen Baba Add [J96 01] Acute respiratory failure with hypoxia ([facility] )     11/11/2022  4:42 PM Maryhelen Baba Add [I48 92] Atrial flutter with rapid ventricular response ([facility] )     11/11/2022  4:42 PM Maryhelen Baba Add [R77 8] Elevated troponin     11/11/2022  4:42 PM Maryhelen Baba Add [R79 89] Elevated lactic acid level     11/11/2022  4:42 PM Maryhelen Baba Add [J90] Bilateral pleural effusion     11/11/2022  4:42 PM Maryhelen Baba Add [N28 89] Right renal mass     11/11/2022  4:43 PM Maryhelen Baba Add [K43 9] Ventral hernia without obstruction or gangrene       ED Disposition     ED Disposition   Transfer to Another Facility-In Network    Condition   --    Date/Time   Fri Nov 11, 2022  4:43 PM    Comment   Moni Leslie should be transferred out to [facility]             MD Documentation    [address] Most Recent Value   Patient Condition The patient has been stabilized such that within reasonable medical probability, no material deterioration of the patient condition or the condition of the unborn child(judith) is likely to result from the transfer   Reason for Transfer Level of Care needed not available at this facility   Benefits of Transfer Specialized equipment and/or services available at the receiving facility (Include comment)________________________  [cardiology, urology]   Risks of Transfer Possible worsening of condition or death during transfer, Potential deterioration of medical condition, Potential for delay in receiving treatment, Increased discomfort during transfer, Loss of IV   Accepting Physician [address] Salazar Name, [address]  [address]    (Name & Tel number) PACS   Provider Certification General risk, such as traffic hazards, adverse weather conditions, rough terrain or turbulence, possible failure of equipment (including vehicle or aircraft), or consequences of actions of persons outside the control of the transport personnel, Unanticipated needs of medical equipment and personnel during transport, Risk of worsening condition, The possibility of a transport vehicle being unavailable      RN Documentation    [address] Melony Simpson Name, [address]  [facility]    (Name & Tel number) PACS      Follow-up Information    None         Patient's Medications    No medications on file       No discharge procedures on file      PDMP Review     None          ED Provider  Electronically Signed by           Edelmira Garcia PA-C  11/11/22 2924

## 2022-11-12 PROBLEM — I71.43 ANEURYSM OF INFRARENAL ABDOMINAL AORTA: Status: ACTIVE | Noted: 2022-01-01

## 2022-11-12 PROBLEM — I48.92 ATRIAL FLUTTER WITH RAPID VENTRICULAR RESPONSE (HCC): Status: ACTIVE | Noted: 2022-01-01

## 2022-11-12 PROBLEM — N28.89 RENAL MASS: Status: ACTIVE | Noted: 2022-01-01

## 2022-11-12 PROBLEM — K43.9 HERNIA OF ABDOMINAL WALL: Status: ACTIVE | Noted: 2022-01-01

## 2022-11-12 PROBLEM — E87.70 VOLUME OVERLOAD: Status: ACTIVE | Noted: 2022-01-01

## 2022-11-12 PROBLEM — J96.01 ACUTE RESPIRATORY FAILURE WITH HYPOXIA (HCC): Status: ACTIVE | Noted: 2022-01-01

## 2022-11-12 NOTE — PROGRESS NOTES
Advanced Heart Failure/Pulmonary Hypertension - Attending Attestation - Jeevanchristian Lise Taylor 71 y o  female MRN: [id number]      Please see complete HF note documented by the fellow/AP; this is attending attestation  Assessment:  71 y o  female Hx per chart p/w hypoxic/hypercapneic respiratory failure  HTN to sbp 190s on arrival  AFL, initially on cardizem and dig  AS, mod-severe, The aortic valve mean gradient is 19 mmHg  The dimensionless velocity index is 0 30  The aortic valve area is 0 86 cm2   Unclear if LFLG true AS vs pseudo-severe AS  HFrEF, EF 30%, LVIDd 5 1cm  Etiology unclear, tic vs CAD vs HTN vs AS  Falls at home  Not seen physician since 1977  Renal mass, suspicious for neoplasm but unclear  Mild transaminitis  Thrombocytopenia 118   infrarenal abdominal aortic aneurysm measuring 4 5 cm with aortic stenosis and focal narrowing of the infrarenal aorta lumen to 10 mm      Drips:  heparin (porcine), 3-20 Units/kg/hr (Order-Specific), Last Rate: 18 Units/kg/hr (11/12/22 1236)  nitroGLYcerin, 5-200 mcg/min      a1c 5 9  Trops flat  ntbnp 20000    Plan:  Vol up on exam, warm, ongoing HTN 11/12  RAP 15 by 11/12/22 echo    UO 1 5L this shift with lasix 40 IV x2 - somewhat suboptimal  SBP still 180-190s on nitro gtt    Escalate lasix to 80 IV bid now 11/12  Strict IOs and daily weights  K>4, Mg>2    Afterload reduce: start po isordil 10/hydral 25 tid  downtitrate nitro gtt as able    bipap as needed now    gdmt in future    Complete dig load and cw maintenance dig  Check dig level tomorrow morning and ECG daily  HR controlled now    cw hep gtt  Rate vs AFL rhythm control strategy to be determined    Further AS workup future    Possible ischemic evaluation future, near term    Workup of renal mass per primary team    Neurohormonal Blockade/GDMT: future  --Beta-Blocker:  --ACEi, ARB or ARNi:  --Aldosterone Receptor Blocker:  --SGLT2-I:  --Hydralazine/nitrates:    --Diuretic:    Other pharmacotherapy (if applicable):  --Digoxin:  --Vericiguat:  --Omecamtiv:    --PH meds:    ICD for SCD Reduction:  --  Interrogation:     Electrical Resynchronization (CRT):  --  Interrogation and % pacing:     Valvular intervention/MitraClip (if applicable):  --    Other advanced or experimental devices (if applicable):  --    Advanced Therapies (If appropriate): --Inotrope:  --LVAD/Transplant Candidacy:    Studies:    EKG - my read:  11/11/22: AFL to 130s, nl axis, LVH, non specific STT changes    TTE 11/12/22:  •  Left Ventricle: Left ventricular cavity size is normal  Wall thickness is moderately increased  There is severe concentric hypertrophy  The left ventricular ejection fraction is 30%  Systolic function is severely reduced  There is severe global hypokinesis  •  Right Ventricle: Right ventricular cavity size is normal  Systolic function is normal  Wall thickness is increased  •  Left Atrium: The atrium is moderately dilated  •  Atrial Septum: The septum bows into the right atrium, suggesting increased left atrial pressure  •  Aortic Valve: The aortic valve is possibly bicuspid  The leaflets are severely calcified  There is severely reduced mobility  There is moderate to severe stenosis  The aortic valve mean gradient is 19 mmHg  The dimensionless velocity index is 0 30  The aortic valve area is 0 86 cm2  The aortic valve velocity is increased due to stenosis but lower than expected due to the presence of decreased flow  •  Mitral Valve: There is mild annular calcification  There is mild regurgitation  •  Tricuspid Valve: There is mild regurgitation  The estimated right ventricular systolic pressure is 11 22 mmHg  •  Pericardium: There is a small pericardial effusion circumferential to the heart  There is no echocardiographic evidence of tamponade        11/11/22 CT AP:  IMPRESSION:     No pulmonary arterial embolism      At least moderate CHF      Small bilateral pleural effusions      Complex 3 3 cm right lower pole renal mass suspicious for renal cell carcinoma  Indeterminate focal area of right hepatic enhancement measuring 10 mm      Hypoenhancing right kidney secondary to advanced renal vascular disease      Advanced atherosclerotic changes; infrarenal abdominal aortic aneurysm measuring 4 5 cm with aortic stenosis and focal narrowing of the infrarenal aorta lumen to 10 mm      Large bowel containing ventral hernia      The study was marked in EPIC for immediate notification  11/11/22 no LE DVT by dopplers      Thank you for the opportunity to participate in the care of this patient      Camila Massey MD  Attending Physician  Advanced Heart Failure and Transplant Cardiology  [facility]

## 2022-11-12 NOTE — H&P
H&P Exam - [address] 71 y o  female MRN: [id number]  Unit/Bed#: [facility] 201-01 Encounter: [id number]      -------------------------------------------------------------------------------------------------------------  Chief Complaint: Fatigue    History of Present Illness   HX and PE limited by: none  Christiano Donnelly is a 71 y o  female who presents with progressive fatigue x 6 weeks  Patient has not seen a physician in 40 years  Unvaccinated - no flu/tdap/pneumococcal vaccines  Denies recent sick contacts  Brought in by family out of concern for progressive weakness and sleeping for longer periods of time  Patient lives with 4 foster children ages 15-34 but they are all mentally disabled and are unable to help take care of her  They noted that the patient fell asleep in her car at one point when she was sitting in her car parked  Patient is typically fully capable of all ADLs and IADLs  She has also had 2 falls at home but denies LOC or headstrike  She denies lightheadedness or dizziness  Social: 87 pack year smoker, quit 6 weeks ago but did not say why or tell family why  Never rec drug user, including IDVU  Does not drink alcohol  Has three children  All present at bedside during interview  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 patient arrived on nitro drip, heparin drip, and was continued on biPAP   VS were HR 100s afib, BP 193/64, satting 92% on 18/10 at 50%  No results for input(s): PH, [id number], [id number], [id number] in the last 72 hours  Recent Labs     11/12/22  0502 11/12/22  1214   PHART 7 271* 7 341*   [id number] 87 0* 78 8*   PO2ART 56 7* 53 2*   [id number] 39 1* 41 7*         -------------------------------------------------------------------------------------------------------------  Assessment and Plan:    (All new diagnoses)    #  Acute heart failure, LVEF 30% - tachycardia-mediated vs  aortic stenosis mediated  #  Atrial fibrillation/flutter  #  Hypertensive emergency  #  Primary respiratory acidosis w/compensatory metabolic alkalosis   #  Severe aortic stenosis, bicuspid  #  Pulmonary hypertension - group 2 vs 3  #  Suspected renal cell carcinoma (3 3 cm R pole mass)  #  Liver mass 1 cm   #  Large non-incarcerated ventral hernia (25o64l6 cm)  #  Abdominal aortic aneurysm   #  Elevated liver transaminases - suspect congestive hepatopathy from CHF  #  Hyperlipidemia  #  Former tobacco smoker - appx 87 pack years (1-2 ppd smoker)  #   Ambulatory dysfunction    Neuro:   • Diagnosis: No acute issues  o Plan: Delirium precautions  o CAM-ICU daily    CV:   • Diagnosis: Acute HFrEF 30%, severe global hypokinesis  o Admission proBNP 21k  o Confirmed on 11/12 [facility] admission echo  o S/p cardizem drip and started on digoxin load while at [facility]  o Troponins slightly elevated to 60s - likely 2/2 demand ischemia  o Plan:  - 40 mg lasix IV BID  - Nitro GTT --> wean off as able  - Starting 10 mg isordil q8h   - BiPAP for preload and afterload reduction  - Target net -2L for today  - Nicky in place for strict I/O  - NPO while on bipap - will place on cardiac/fluid restriction when able to come off of bipap  - Further ischemic workup eg cardiac cath - will appreciate cardiology's assistance with timing  • Diagnosis: Atrial fibrillation/flutter  o Seen on admission to [facility] = flutter; now in afib rate 100s  o [id number] is at least 4  o TSH normal  o A1C 5 9, +hyperlipidemia  o Plan:  o AC: heparin gtt (in event of biopsy)  o Rhythm: Continue digoxin load 125 mcg daily   o Rate: Holding coreg initiation in setting of acute heart failure per cards  o Continuous cardiac monitoring  • Diagnosis: Severe stenosis of bicuspid aortic valve   o Seen on 11/12 echo  o Plan: Await initiation of beta blocker per HF recs  Holding for acute HF exacerbation  • Diagnosis: Hypertensive emergency  o Admission /98, went as high as   o Plan: Target /90 within first 24 hrs       Pulm:  • Diagnosis: Combined hypoxic hypercarbic respiratory failure  o Plan: BiPAP 18/10, 50% FiO2 around the clock  o Target O2 > 92%  o Respiratory protocol  • Diagnosis: 87 pack year smoker  o Quit 6 weeks PTA  o Plan: Outpatient spirometry/PFTs  o Needs flu, pneumonia vaccines prior to discharge       GI:   • Diagnosis: Ventral hernia  o 17 x 14 x 5 cm  o Monitor abdominal exams for signs of incarceration  o Plan: Implement bowel regimen when starting a po diet  • Diagnosis: GI ppx   o Plan: Not indicated  • Diagnosis: Elevated transaminases - likely heart failure related congestive hepatopathy  o Plan: Treat HF and monitor for improvement of transaminases, then if needed, can initiate further workup eg chronic and acute hepatitis panels      :   • Diagnosis: No acute issues  o Plan: I/O strict with case in place for CHF      F/E/N:   F: None  E: Goal Mg>2 0, Phos>3 0, K>4 0  BMP checks q12h while diuresing  • N: NPO, sips with meds while on BiPAP   When off biPAP, will change to 1 8L fluid restriction, 2g Na restriction, cardiac diet       Heme/Onc:   • Diagnosis: DVT ppx  o Plan: Already on hep gtt for [facility] for afib  o Maintain on hep gtt in event of need for onc biopsy  • Diagnosis: Positive D-dimer - suspect in setting of CHF  o Negative CTA PE, negative duplex studies, negative COVID  o Plan: No further workup  o In absence of fevers/ leukocytosis, will not treat for cellulitis of lower extremities unless develops gely drainage/open wounds      Endo:   • Diagnosis: Prediabetes A1C 5 9  o Plan: Monitor BMPs  If elevated BG can start SSI    ID:   • Diagnosis: No acute issues  o Flu/COVID/RSV negative  o F/u peripheral bcx x2  o UA on admission no bacteria, nitrites, or leukocytes        MSK/Skin:   • Diagnosis: Lower extremity erythema 2/2 CHF  o Plan: Wound care consult        Disposition: Admit to Stepdown Level 1  Code Status: Level 1 - Full Code  --------------------------------------------------------------------------------------------------------------  Review of Systems   Constitutional: Positive for fatigue  Negative for chills and fever  Respiratory: Positive for shortness of breath  Negative for cough, chest tightness and wheezing  Cardiovascular: Positive for leg swelling  Negative for chest pain and palpitations  Gastrointestinal: Negative for blood in stool, constipation and diarrhea  Genitourinary: Negative for difficulty urinating, dysuria, frequency, hematuria and urgency  Skin: Positive for rash (Bilateral lower extremities have increasingly become red in appearance)  Neurological: Negative for weakness, light-headedness, numbness and headaches  Psychiatric/Behavioral: Positive for confusion and sleep disturbance (Sleeping for prolonged periods of time during the day)  A 12-point, complete review of systems was reviewed and negative except as stated above     Physical Exam  Vitals reviewed  Constitutional:       General: She is in acute distress  Appearance: She is ill-appearing  She is not toxic-appearing or diaphoretic  HENT:      Head: Normocephalic and atraumatic  Eyes:      General: No scleral icterus  Right eye: No discharge  Left eye: No discharge  Extraocular Movements: Extraocular movements intact  Pupils: Pupils are equal, round, and reactive to light     Cardiovascular:      Rate and Rhythm: Tachycardia present  Rhythm irregular  Pulmonary:      Effort: Respiratory distress present  Breath sounds: Rales present  No wheezing  Abdominal:      General: There is no distension  Palpations: Abdomen is soft  Tenderness: There is no abdominal tenderness  There is no guarding  Musculoskeletal:      Right lower leg: Edema (1+ pitting) present  Left lower leg: Edema (1+ pitting) present  Skin:     General: Skin is warm and dry  Findings: Rash (linear excoriations along bilateral anterior tibias) present  Neurological:      General: No focal deficit present  Mental Status: She is alert and oriented to person, place, and time  Sensory: No sensory deficit  Psychiatric:         Behavior: Behavior normal        --------------------------------------------------------------------------------------------------------------  Vitals:   Vitals:    11/12/22 0839 11/12/22 1100   BP:  154/71   Pulse:  87   SpO2: 92%    Weight:  68 kg (150 lb)   Height:  5' 6" (1 676 m)     Temp  Min: 98 2 °F (36 8 °C)  Max: 98 2 °F (36 8 °C)     Height: 5' 6" (167 6 cm)  Body mass index is 24 21 kg/m²    CO:      Laboratory and Diagnostics:  Results from last 7 days   Lab Units 11/12/22  1127 11/11/22  2328 11/11/22  1056   WBC Thousand/uL 9 72 7 07 7 24   HEMOGLOBIN g/dL 13 8 13 5 14 2   HEMATOCRIT % 43 5 43 1 44 6   PLATELETS Thousands/uL 118* 104* 115*   NEUTROS PCT %  --   --  87*   MONOS PCT %  --   --  8     Results from last 7 days   Lab Units 11/11/22  1056   SODIUM mmol/L 138   POTASSIUM mmol/L 4 9   CHLORIDE mmol/L 98   CO2 mmol/L 36*   ANION GAP mmol/L 4   BUN mg/dL 46*   CREATININE mg/dL 1 15   CALCIUM mg/dL 9 4   GLUCOSE RANDOM mg/dL 138   ALT U/L 289*   ALK PHOS U/L 84   ALBUMIN g/dL 2 8*   TOTAL BILIRUBIN mg/dL 1 72*     Results from last 7 days   Lab Units 11/11/22  1056   MAGNESIUM mg/dL 2 0      Results from last 7 days   Lab Units 11/12/22  0529 11/11/22  2330 11/11/22  1056   INR   --  1 26* 1 22*   PTT seconds 24 25 26          Results from last 7 days   Lab Units 11/11/22  1304 11/11/22  1119   LACTIC ACID mmol/L 1 4 2 2*     ABG:  Results from last 7 days   Lab Units 11/12/22  0502   PH ART  7 271*   PCO2 ART mm Hg 87 0*   PO2 ART mm Hg 56 7*   HCO3 ART mmol/L 39 1*   BASE EXC ART mmol/L 8 4   ABG SOURCE  Radial, Left     VBG:  Results from last 7 days   Lab Units 11/12/22  0502   ABG SOURCE  Radial, Left     Results from last 7 days   Lab Units 11/11/22  1056   PROCALCITONIN ng/ml 0 19       Micro:  Results from last 7 days   Lab Units 11/11/22  1119   BLOOD CULTURE  Received in Microbiology Lab  Culture in Progress  Received in Microbiology Lab  Culture in Progress  EKG: Afib HR 100s  Imaging: I have personally reviewed pertinent reports  and I have personally reviewed pertinent films in PACS    Historical Information   No past medical history on file  Past Surgical History:   Procedure Laterality Date   • APPENDECTOMY     • TONSILLECTOMY     • WRIST SURGERY       Social History   Social History     Substance and Sexual Activity   Alcohol Use None     Social History     Substance and Sexual Activity   Drug Use Never     Social History     Tobacco Use   Smoking Status Current Every Day Smoker   Smokeless Tobacco Never Used     Exercise History: Independent w/ADLs prior to admission  Family History:   No family history on file    I have reviewed this patient's family history and commented on sigificant items within the HPI      Medications:  Current Facility-Administered Medications   Medication Dose Route Frequency   • heparin (porcine) 25,000 units in 0 45% NaCl 250 mL infusion (premix)  3-20 Units/kg/hr (Order-Specific) Intravenous Titrated   • heparin (porcine) injection 1,950 Units  1,950 Units Intravenous Q1H PRN   • heparin (porcine) injection 3,900 Units  3,900 Units Intravenous Q1H PRN   • lidocaine (URO-JET) 2 % urethral/mucosal gel 1 application  1 application Urethral Once   • nitroGLYcerin (TRIDIL) 50 mg in 250 mL infusion (premix)  5-200 mcg/min Intravenous Titrated     Home medications:  None     Allergies:  No Known Allergies  ------------------------------------------------------------------------------------------------------------  Advance Directive and Living Will:      Power of :    POLST:    ------------------------------------------------------------------------------------------------------------  Anticipated Length of Stay is > 2 midnights    Care Time Delivered:   Upon my evaluation, this patient had a high probability of imminent or life-threatening deterioration due to congestive heart failure, which required my direct attention, intervention, and personal management  I have personally provided 45 minutes (1100 to 1145) of critical care time, exclusive of procedures, teaching, family meetings, and any prior time recorded by providers other than myself  Lesvia Noriega MD   PGY-2 Internal Medicine  [address] [place] [address]          Portions of the record may have been created with voice recognition software  Occasional wrong word or "sound a like" substitutions may have occurred due to the inherent limitations of voice recognition software    Read the chart carefully and recognize, using context, where substitutions have occurred

## 2022-11-12 NOTE — ED CARE HANDOFF
Emergency Department Sign Out Note        Sign out and transfer of care from [place]   See Separate Emergency Department note  The patient, Oriana Goodwin, was evaluated by the previous provider for SOB, weakness  Workup Completed: At approximately 5 AM, the patient was found to be becoming increasingly hypoxic, in the low 80s on 5L  ABG and CXR performed  ABG demonstrated hypoxic respiratory failure with O2 of 57 and CO2 of 87  CXR demonstrates evidence of pulmonary edema, worsening effusion particularly on right  The patient was then placed on bipap  Another 40 mg of IV lasix was given  Bedside echo demonstrating markedly low EF  I discussed with cardiology and Dr Krista Palma at [place], plan for stopping cardizem at this time, and initiating nitroglycerin as patient's SBP was in 170s on re-assessment  This intermittently maintained the patient's pressure <140, however required frequent titration  Patient's HR maintained in the 80s off of cardizem  Patient required frequent titration of bipap, initially responded on 60%, and slowly required 100% again as she continued to have fluctuating needs  She was mentating well throughout this, and her work of breathing and respiratory rate seemed significantly improved with the BiPaP  I discussed patient's status with daughter in the room  Code status was discussed and she was to remain full code per patient's wishes  Plan was for transfer to [facility] at level 1 step down  ED Course / Workup Pending (followup):         HEART Risk Score    Flowsheet Row Most Recent Value   Heart Score Risk Calculator    History 1 Filed at: 11/11/2022 1307   ECG 1 Filed at: 11/11/2022 1307   Age 2 Filed at: 11/11/2022 1307   Risk Factors 1 Filed at: 11/11/2022 1307   Troponin 2 Filed at: 11/11/2022 1307   HEART Score 7 Filed at: 11/11/2022 1307                        Wells' Criteria for PE    Flowsheet Row Most Recent Value   Wells' Criteria for PE    Clinical signs and symptoms of DVT 3 Filed at: 11/11/2022 1307   PE is primary diagnosis or equally likely 3 Filed at: 11/11/2022 1307   HR >100 1 5 Filed at: 11/11/2022 1307   Immobilization at least 3 days or Surgery in the previous 4 weeks 0 Filed at: 11/11/2022 1307   Previous, objectively diagnosed PE or DVT 0 Filed at: 11/11/2022 1307   Hemoptysis 0 Filed at: 11/11/2022 1307   Malignancy with treatment within 6 months or palliative 0 Filed at: 11/11/2022 1307   Wells' Criteria Total 7 5 Filed at: 11/11/2022 1307                ED Course as of 11/12/22 0757   Fri Nov 11, 2022   2230 Worsening leg edema, SOB  Clinically overloaded  Presented in rapid aflutter in 130s  On 2L O2  On cardizem, digoxin  CTA showed small effusions BL (suspected to be 2/2 pulmonary edema)  Concerning mass for right RCC  Medicine consult  Transfer to [facility]     Sat Nov 12, 2022   0514 pCO2, Arterial(!!): 87 0   0515 pO2, Arterial(!!): 56 7   0736 pCO2, Arterial(!!): 87 0         Disposition  Final diagnoses:   CHF (congestive heart failure) (HCC)   Acute respiratory failure with hypoxia (HCC)   Atrial flutter with rapid ventricular response (HCC)   Elevated troponin   Elevated lactic acid level   Bilateral pleural effusion   Right renal mass   Ventral hernia without obstruction or gangrene     Time reflects when diagnosis was documented in both MDM as applicable and the Disposition within this note     Time User Action Codes Description Comment    11/11/2022  4:41 PM Franne Liner Add [I50 9] CHF (congestive heart failure) ([address] )     11/11/2022  4:41 PM Franne Liner Add [J96 01] Acute respiratory failure with hypoxia ([address] )     11/11/2022  4:42 PM Uma Ovens [I48 92] Atrial flutter with rapid ventricular response ([address] )     11/11/2022  4:42 PM Uma Ovens [R77 8] Elevated troponin     11/11/2022  4:42 PM Franne Liner Add [R79 89] Elevated lactic acid level     11/11/2022  4:42 PM Franne Liner Add [J90] Bilateral pleural effusion 11/11/2022  4:42 PM Aisha Robles Add [N28 89] Right renal mass     11/11/2022  4:43 PM Aisha Robles Add [K43 9] Ventral hernia without obstruction or gangrene       ED Disposition     ED Disposition   Transfer to Another Facility-In Network    Condition   --    Date/Time   Fri Nov 11, 2022  4:43 PM    Comment   Dewey Portillo should be transferred out to [facility]  MD Documentation    Char Wise Most Recent Value   Patient Condition The patient has been stabilized such that within reasonable medical probability, no material deterioration of the patient condition or the condition of the unborn child(judith) is likely to result from the transfer   Reason for Transfer Level of Care needed not available at this facility   Benefits of Transfer Specialized equipment and/or services available at the receiving facility (Include comment)________________________  [cardiology, urology]   Risks of Transfer Possible worsening of condition or death during transfer, Potential deterioration of medical condition, Potential for delay in receiving treatment, Increased discomfort during transfer, Loss of IV   Accepting Physician [address] Name, [facility]    (Name & Tel number) PACS   Provider Certification General risk, such as traffic hazards, adverse weather conditions, rough terrain or turbulence, possible failure of equipment (including vehicle or aircraft), or consequences of actions of persons outside the control of the transport personnel, Unanticipated needs of medical equipment and personnel during transport, Risk of worsening condition, The possibility of a transport vehicle being unavailable      RN Documentation    Flowsheet Row Most [address] [facility] Name, [address]  [facility]    (Name & Tel number) PACS      Follow-up Information    None       There are no discharge medications for this patient      No discharge procedures on file        ED Provider  Electronically Signed by     Gage Pichardo MD  11/12/22 9852

## 2022-11-12 NOTE — ED NOTES
Pt destated to high 70's-low 80's on 5L, respiratory and MD made aware  Respiratory placed pt on mid-flow 15L   Pt O2 now at 92%     Kristina Gonzalez RN  11/12/22 1019 Edgar Hinojosa RN  11/12/22 4313

## 2022-11-12 NOTE — RESPIRATORY THERAPY NOTE
RT Protocol Note  Nyla Nurse 71 y o  female MRN: [id number]  Unit/Bed#: [facility] 201-01 Encounter: [id number]    Assessment    Principal Problem:    Acute respiratory failure with hypoxia [facility]  Active Problems:    Atrial flutter with rapid ventricular response (HCC)    Renal mass    Volume overload    Hernia of abdominal wall    Aneurysm of infrarenal abdominal aorta      Home Pulmonary Medications:         No past medical history on file  Social History     Socioeconomic History    Marital status: Single     Spouse name: Not on file    Number of children: Not on file    Years of education: Not on file    Highest education level: Not on file   Occupational History    Not on file   Tobacco Use    Smoking status: Current Every Day Smoker    Smokeless tobacco: Never Used   Substance and Sexual Activity    Alcohol use: Not on file    Drug use: Never    Sexual activity: Not on file   Other Topics Concern    Not on file   Social History Narrative    Not on file     Social Determinants of Health     Financial Resource Strain: Not on file   Food Insecurity: Not on file   Transportation Needs: Not on file   Physical Activity: Not on file   Stress: Not on file   Social Connections: Not on file   Intimate Partner Violence: Not on file   Housing Stability: Not on file       Subjective         Objective    Physical Exam:   Assessment Type: Assess only  General Appearance: Alert, Awake  Respiratory Pattern: Assisted, Spontaneous  Chest Assessment: Chest expansion symmetrical  Bilateral Breath Sounds: Diminished  Cough: Non-productive, Congested, Strong  O2 Device: bipap 16/8/90%    Vitals:  Blood pressure 154/71, pulse 87, height 5' 6" (1 676 m), weight 68 kg (150 lb), SpO2 92 %      Results from last 7 days   Lab Units 11/12/22  0502   PH ART  7 271*   PCO2 ART mm Hg 87 0*   PO2 ART mm Hg 56 7*   HCO3 ART mmol/L 39 1*   BASE EXC ART mmol/L 8 4   O2 CONTENT ART mL/dL 17 7   O2 HGB, ARTERIAL % 85 7*   ABG SOURCE  Radial, Left RITU TEST  Yes       Imaging and other studies: I have personally reviewed pertinent reports  O2 Device: bipap 16/8/90%     Plan             Resp Comments: BiPAP settings changed during MD rounds to back up RR 16 18/10/90%  ABG in 30-45mins

## 2022-11-12 NOTE — EMTALA/ACUTE CARE TRANSFER
[facility] EMERGENCY DEPARTMENT  [address] [place] [id number]  Dept: [phone]      EMTALA TRANSFER CONSENT    NAME Daisy Rodney                                         1950                              MRN [id number]    I have been informed of my rights regarding examination, treatment, and transfer   by Dr Jeramy Ahn*    Benefits: Specialized equipment and/or services available at the receiving facility (Include comment)________________________ (cardiology, urology)    Risks: Possible worsening of condition or death during transfer, Potential deterioration of medical condition, Potential for delay in receiving treatment, Increased discomfort during transfer, Loss of IV      Consent for Transfer:  I acknowledge that my medical condition has been evaluated and explained to me by the emergency department physician or other qualified medical person and/or my attending physician, who has recommended that I be transferred to the service of  Accepting Physician: Dr Nino at [facility] Name, [address] : [place]  The above potential benefits of such transfer, the potential risks associated with such transfer, and the probable risks of not being transferred have been explained to me, and I fully understand them  The doctor has explained that, in my case, the benefits of transfer outweigh the risks  I agree to be transferred  I authorize the performance of emergency medical procedures and treatments upon me in both transit and upon arrival at the receiving facility  Additionally, I authorize the release of any and all medical records to the receiving facility and request they be transported with me, if possible  I understand that the safest mode of transportation during a medical emergency is an ambulance and that the Hospital advocates the use of this mode of transport   Risks of traveling to the receiving facility by car, including absence of medical control, life sustaining equipment, such as oxygen, and medical personnel has been explained to me and I fully understand them  (GILDA CORRECT BOX BELOW)  [ x ]  I consent to the stated transfer and to be transported by ambulance/helicopter  [  ]  I consent to the stated transfer, but refuse transportation by ambulance and accept full responsibility for my transportation by car  I understand the risks of non-ambulance transfers and I exonerate the Hospital and its staff from any deterioration in my condition that results from this refusal     X___________________________________________    DATE  22  TIME________  Signature of patient or legally responsible individual signing on patient behalf           RELATIONSHIP TO PATIENT_________________________          Provider Certification    NAME Moni Leslie                                        [facility] 1950                              MRN [id number]    A medical screening exam was performed on the above named patient  Based on the examination:    Condition Necessitating Transfer The primary encounter diagnosis was CHF (congestive heart failure) ([address] )  Diagnoses of Acute respiratory failure with hypoxia ([address] ), Atrial flutter with rapid ventricular response (HCC), Elevated troponin, Elevated lactic acid level, Bilateral pleural effusion, Right renal mass, and Ventral hernia without obstruction or gangrene were also pertinent to this visit      Patient Condition: The patient has been stabilized such that within reasonable medical probability, no material deterioration of the patient condition or the condition of the unborn child(judith) is likely to result from the transfer    Reason for Transfer: Level of Care needed not available at this facility    Transfer Requirements: [facility]   · Space available and qualified personnel available for treatment as acknowledged by PACS  · Agreed to accept transfer and to provide appropriate medical treatment as acknowledged by       Dr Nino  · Appropriate medical records of the examination and treatment of the patient are provided at the time of transfer   [facility], [address] _______  · Transfer will be performed by qualified personnel from    and appropriate transfer equipment as required, including the use of necessary and appropriate life support measures  Provider Certification: I have examined the patient and explained the following risks and benefits of being transferred/refusing transfer to the patient/family:  General risk, such as traffic hazards, adverse weather conditions, rough terrain or turbulence, possible failure of equipment (including vehicle or aircraft), or consequences of actions of persons outside the control of the transport personnel, Unanticipated needs of medical equipment and personnel during transport, Risk of worsening condition, The possibility of a transport vehicle being unavailable      Based on these reasonable risks and benefits to the patient and/or the unborn child(judith), and based upon the information available at the time of the patient’s examination, I certify that the medical benefits reasonably to be expected from the provision of appropriate medical treatments at another medical facility outweigh the increasing risks, if any, to the individual’s medical condition, and in the case of labor to the unborn child, from effecting the transfer      X____________________________________________ DATE 11/11/22        TIME_______      ORIGINAL - SEND TO MEDICAL RECORDS   COPY - SEND WITH PATIENT DURING TRANSFER

## 2022-11-12 NOTE — CONSULTS
Consultation - Advanced Heart Failure Team  Jacky Gonzalez 71 y o  female MRN: [id number]  Unit/Bed#: [facility] 201-01 Encounter: [id number]            Inpatient consult to Heart Failure Service     Performed by  Collette Rohrer, MD     Authorized by Sugar Cox MD            PCP: No primary care provider on file  Outpatient Cardiologist: Does not have one     History of Present Illness   Physician Requesting Consult: Clyde Clements MD  Reason for Consult / Principal Problem: New diagnosis of HFrEF    HPI: Jacky Gonzalez is a 71y o  year old female with chronic tobacco use disorder, who presented with 6-week history of progressively worsening SOB, frequent falls, and anorexia  Patient had not seen a physician for over 40 years and is unaware of any medical conditions  She initially presented to [facility], found to be hypoxic in low 80s ( ABG O2 57, CO2 87), CXR showed pulmonary edema, ECG suggestive of atrial flutter with 2:1 AV conduction  Her BP was in 190s/70s  She was placed on biPAP, received Lasix 40mg IV x1, Cardizem drip and eventually on nitroglycerine drip,  was transferred to [facility] for further management  Troponin negative for acute ischemia,       Review of Systems  Review of system was conducted and was negative except for as stated in the HPI  Historical Information   No past medical history on file    Past Surgical History:   Procedure Laterality Date   • APPENDECTOMY     • TONSILLECTOMY     • WRIST SURGERY       Social History     Substance and Sexual Activity   Alcohol Use None     Social History     Substance and Sexual Activity   Drug Use Never     Social History     Tobacco Use   Smoking Status Current Every Day Smoker   Smokeless Tobacco Never Used     Family History: non-contributory    Meds/Allergies   Hospital Medications:   Current Facility-Administered Medications   Medication Dose Route Frequency   • digoxin (LANOXIN) injection 125 mcg  125 mcg Intravenous Daily   • heparin (porcine) 25,000 units in 0 45% NaCl 250 mL infusion (premix)  3-20 Units/kg/hr (Order-Specific) Intravenous Titrated   • heparin (porcine) injection 1,950 Units  1,950 Units Intravenous Q1H PRN   • heparin (porcine) injection 3,900 Units  3,900 Units Intravenous Q1H PRN   • isosorbide dinitrate (ISORDIL) tablet 10 mg  10 mg Oral TID after meals   • lidocaine (URO-JET) 2 % urethral/mucosal gel 1 application  1 application Urethral Once   • nitroGLYcerin (TRIDIL) 50 mg in 250 mL infusion (premix)  5-200 mcg/min Intravenous Titrated     Home Medications:   No medications prior to admission  No Known Allergies    Objective   Vitals: Blood pressure (!) 188/77, pulse 102, temperature (!) 97 4 °F (36 3 °C), temperature source Oral, resp  rate 18, height 5' 6" (1 676 m), weight 68 kg (150 lb), SpO2 95 %  Orthostatic Blood Pressures    Flowsheet Row Most Recent Value   Blood Pressure 188/77 filed at 11/12/2022 1415   Patient Position - Orthostatic VS Lying filed at 11/12/2022 1200            Invasive Devices  Report    Peripheral Intravenous Line  Duration           Peripheral IV 11/11/22 Right Forearm 1 day    Peripheral IV 11/12/22 Left;Ventral (anterior) Forearm <1 day          Drain  Duration           Urethral Catheter 16 Fr  <1 day                Physical Exam    GEN: Leona Krabbe appears chronically ill, alert and oriented x 3, pleasant and cooperative, on BiPAP, appeared unkempt      HEENT:  Normocephalic, atraumatic, anicteric, dry mucous membranes  NECK: No JVD or carotid bruits, however on BiPAP and hard to hear mumurs    HEART: Irregular rhythm, normal rate, normal S1 and S2, faint systolic murmur in aortic position 2-3/6    LUNGS: BiPAP sounds, difficult to hear lung sounds   ABDOMEN:  Normoactive bowel sounds, soft, no tenderness, no distention  EXTREMITIES: peripheral pulses palpable; 1-2+ edema in the lower extremities   NEURO: no gross focal findings; cranial nerves grossly intact   SKIN:  Dry, intact, warm to touch    Lab Results: I have personally reviewed pertinent lab results  Results from last 7 days   Lab Units 11/11/22  1514 11/11/22  1303 11/11/22  1056   HS TNI 0HR ng/L  --   --  82*   HS TNI 2HR ng/L  --  82*  --    HS TNI 4HR ng/L 61*  --   --      Results from last 7 days   Lab Units 11/11/22  1056   NT-PRO BNP pg/mL 20,923*     Results from last 7 days   Lab Units 11/12/22  1304 11/11/22  1056   POTASSIUM mmol/L 3 5 4 9   CO2 mmol/L 41* 36*   CHLORIDE mmol/L 96 98   BUN mg/dL 30* 46*   CREATININE mg/dL 0 81 1 15     Results from last 7 days   Lab Units 11/12/22  1127 11/11/22  2328 11/11/22  1056   HEMOGLOBIN g/dL 13 8 13 5 14 2   HEMATOCRIT % 43 5 43 1 44 6   PLATELETS Thousands/uL 118* 104* 115*     Results from last 7 days   Lab Units 11/12/22  1304   TRIGLYCERIDES mg/dL 106   HDL mg/dL 42*   LDL CALC mg/dL 126*     Results from last 7 days   Lab Units 11/12/22  1127 11/11/22  2328 11/11/22  1056   INR  1 11 1 26* 1 22*         Imaging: I have personally reviewed pertinent reports  EKG:   Date: 11/11/2022  Interpretation: Atrial flutter with 2:1 A-V conduction  Non-specific intra-ventricular conduction block  Inferior infarct (cited on or before 11-NOV-2022)  Cannot rule out Anterior infarct , age undetermined  T wave abnormality, consider lateral ischemia  Abnormal ECG  When compared with ECG of 11-NOV-2022 10:46,  Questionable change in QRS axis    ECHO:  No results found for this or any previous visit  Results for orders placed during the hospital encounter of 11/12/22    Echo complete w/ contrast if indicated    Interpretation Summary  •  Left Ventricle: Left ventricular cavity size is normal  Wall thickness is moderately increased  There is severe concentric hypertrophy  The left ventricular ejection fraction is 30%  Systolic function is severely reduced  There is severe global hypokinesis    •  Right Ventricle: Right ventricular cavity size is normal  Systolic function is normal  Wall thickness is increased  •  Left Atrium: The atrium is moderately dilated  •  Atrial Septum: The septum bows into the right atrium, suggesting increased left atrial pressure  •  Aortic Valve: The aortic valve is possibly bicuspid  The leaflets are severely calcified  There is severely reduced mobility  There is moderate to severe stenosis  The aortic valve mean gradient is 19 mmHg  The dimensionless velocity index is 0 30  The aortic valve area is 0 86 cm2  The aortic valve velocity is increased due to stenosis but lower than expected due to the presence of decreased flow  •  Mitral Valve: There is mild annular calcification  There is mild regurgitation  •  Tricuspid Valve: There is mild regurgitation  The estimated right ventricular systolic pressure is 98 75 mmHg  •  Pericardium: There is a small pericardial effusion circumferential to the heart  There is no echocardiographic evidence of tamponade  Assessment/Plan     Assessment:    1  New diagnosis of HFrEF  - Presented with 6 weeks of increasing hypoxia and SOB   - Noted to have 4+ JUSTIN on presentation   - Echo( 11/12/2022)- EF 30%, with severe global hypokinesis   - NTproBNP 20923  - Radiographic evidence of pulmonary edema   - Trops negative for acute ischemia   - Lasix 40mg IV x1, I/O -1630 ml since 7am on 11/12  - The right atrial pressure is estimated at 15 0 mmHg  The inferior vena cava is dilated  Respirophasic changes were blunted (less than 50% variation)  2  Atrial flutter with RVR-   - Presented to [facility] with Aflutter, HR 120s    - Initially started on Cardizem, HR improved, however later started on Nitroglycerin drip due to hypertensive urgency/emergency   3  Hypertensive urgency/emergency   - Likely chronic, undiagnosed   - LVH noted on echocardiogram  - BP 190s/70s at [facility], requiring cardizem and nitroglycerin GGT     4  Aortic stenosis- moderate to severe; functional bicuspid?   - aortic valve mean gradient is 19 mmHg  The dimensionless velocity index is 0 30  The aortic valve area is 0 86 cm2  - Recent episodes of dizziness and ambulatory dysfunction   - Presented with SOB and pulmonary edema     5  Acute hypoxic hypercapnicrespiratory distress   - HCO3 of 41, likely chronic     6  Renal mass- seen on CT    7  Pulmonary hypertension- CT chest suggestive  - The estimated right ventricular systolic pressure is 87 89 mmHg on echocardiogram   8  Liver mass-  9  Abdominal aortic aneurysm   10  Nicotine dependence  11  Transaminitis   12  Hyperlipidemia- LDL elevated   13  Thrombocytopenia- 118 ; possible from hepatic disease? Plan:  1  Continue with Lasix IV 40mg BID; monitor I/Os, Na, K and Mg levels   2  Hold Imdur and Coreg considering acute decompensation   3  Will initiate Isordil 10mg for afterload reduction   4  C/w digoxin 125mcg for rate control   5  Wean off Bipap as tolerated   6  Will initiate statins once transaminitis improves   7  Continue with Hepain GGT for Aflutter(CHADSVASC 4 points);  DOAC once plan for renal biopsy/LHC are decided   8  EP consult for Aflutter ablation, she continue to be in Aflutter with variable block  9  Cardiomyopathy work once respiratory status improves, she will benefit from [facility] to evaluate of ICM and possible a CMR   10  RHC to evaluate for pulmonary hypertension as suggested on CT         Case discussed and reviewed with Dr Corina Still who agrees with my assessment and plan  Thank you for involving us in the care of your patient        Hoang Issa MD  Cardiology Fellow   PGY-4

## 2022-11-12 NOTE — RESPIRATORY THERAPY NOTE
RT Protocol Note  Wilhelmenia Boast 71 y o  female MRN: [id number]  Unit/Bed#: [facility] 201-01 Encounter: [id number]    Assessment    Principal Problem:    Acute respiratory failure with hypoxia [facility]  Active Problems:    Atrial flutter with rapid ventricular response (HCC)    Renal mass    Volume overload    Hernia of abdominal wall    Aneurysm of infrarenal abdominal aorta      Home Pulmonary Medications:         No past medical history on file  Social History     Socioeconomic History    Marital status: Single     Spouse name: Not on file    Number of children: Not on file    Years of education: Not on file    Highest education level: Not on file   Occupational History    Not on file   Tobacco Use    Smoking status: Current Every Day Smoker    Smokeless tobacco: Never Used   Substance and Sexual Activity    Alcohol use: Not on file    Drug use: Never    Sexual activity: Not on file   Other Topics Concern    Not on file   Social History Narrative    Not on file     Social Determinants of Health     Financial Resource Strain: Not on file   Food Insecurity: Not on file   Transportation Needs: Not on file   Physical Activity: Not on file   Stress: Not on file   Social Connections: Not on file   Intimate Partner Violence: Not on file   Housing Stability: Not on file       Subjective         Objective    Physical Exam:   Assessment Type: Assess only  General Appearance: Alert, Awake  Respiratory Pattern: Assisted, Spontaneous  Chest Assessment: Chest expansion symmetrical  Bilateral Breath Sounds: Diminished  Cough: Non-productive, Congested, Strong  O2 Device: (P) bipap 16/8/90%    Vitals:  SpO2 92 %      Results from last 7 days   Lab Units 11/12/22  0502   PH ART  7 271*   PCO2 ART mm Hg 87 0*   PO2 ART mm Hg 56 7*   HCO3 ART mmol/L 39 1*   BASE EXC ART mmol/L 8 4   O2 CONTENT ART mL/dL 17 7   O2 HGB, ARTERIAL % 85 7*   ABG SOURCE  Radial, Left   RITU TEST  Yes       Imaging and other studies: I have personally reviewed pertinent reports  O2 Device: (P) bipap 16/8/90%     Plan             Resp Comments: (P) Pt arrived via EMS on bipap 16/8/100%  Pt placed on V60 16/8 and FiO2 titrated to 90% for SpO2 of 92%  Pt former 2ppd smoker

## 2022-11-13 NOTE — PROGRESS NOTES
Advanced Heart Failure/Pulmonary Hypertension Service Progress Note - Kamari Catalan 71 y o  female MRN: [id number]    [facility] En[facility]: [id number]    Assessment:  71 y o  female Hx per chart p/w hypoxic/hypercapneic respiratory failure  HTN to sbp 190s on arrival  AFL, initially on cardizem and dig  AS, mod-severe, The aortic valve mean gradient is 19 mmHg  The dimensionless velocity index is 0 30  The aortic valve area is 0 86 cm2   Unclear if LFLG true AS vs pseudo-severe AS  HFrEF, EF 30%, LVIDd 5 1cm  Etiology unclear, TIC vs CAD vs HTN vs AS  Falls at home  Not seen physician since 1977  Renal mass, suspicious for neoplasm but unclear  Mild transaminitis  Thrombocytopenia 118   infrarenal abdominal aortic aneurysm measuring 4 5 cm with aortic stenosis and focal narrowing of the infrarenal aorta lumen to 10 mm  Heavy long term smoker    Drips:  heparin (porcine), 3-20 Units/kg/hr (Order-Specific), Last Rate: 18 Units/kg/hr (11/13/22 0835)  nitroGLYcerin, 5-200 mcg/min, Last Rate: 200 mcg/min (11/13/22 1045)    a1c 5 9  Trops flat  ntbnp [id number]    UO 4L, net neg 3 3L    Tele: AFL, HR 80-110s    Plan:  Vol up on exam, warm, ongoing HTN  RAP 15 by 11/12/22 echo    UO better on escalated diuretics  SBP still elevated on nitro gtt    C/w lasix to 80 IV bid for now  Strict IOs and daily weights  K>4, Mg>2    Preload+Afterload reduce: po isordil 10+hydral 25 tid  Agree with labetolol as temporizing measure; switch to gdmt bblocker in future  downtitrate nitro gtt as able    bipap as needed - trialing off bipap 11/13    gdmt in future    Complete dig load and cw maintenance dig - space to q48h, 11/13 dig level 1 9  Check dig level again tomorrow morning and ECG daily  HR controlled now 80-110s    cw hep gtt  Rate vs AFL rhythm control strategy to be determined    Further AS evaluation future; mod-severe, Unclear if LFLG true AS vs pseudo-severe AS; doubt benefit of BAV now    Possible ischemic evaluation future, near term    Workup of renal mass per primary team    Neurohormonal Blockade/GDMT: future  --Beta-Blocker:  --ACEi, ARB or ARNi:  --Aldosterone Receptor Blocker:  --SGLT2-I:  --Hydralazine/nitrates:    --Diuretic:    Other pharmacotherapy (if applicable):  --Digoxin:  --Vericiguat:  --Omecamtiv:    --PH meds:    ICD for SCD Reduction:  --  Interrogation:     Electrical Resynchronization (CRT):  --  Interrogation and % pacing:     Valvular intervention/MitraClip (if applicable):  --    Other advanced or experimental devices (if applicable):  --    Advanced Therapies (If appropriate): --Inotrope:  --LVAD/Transplant Candidacy:    Studies:    EKG - my read:  11/11/22: AFL to 130s, nl axis, LVH, non specific STT changes    TTE 11/12/22:  •  Left Ventricle: Left ventricular cavity size is normal  Wall thickness is moderately increased  There is severe concentric hypertrophy  The left ventricular ejection fraction is 30%  Systolic function is severely reduced  There is severe global hypokinesis  •  Right Ventricle: Right ventricular cavity size is normal  Systolic function is normal  Wall thickness is increased  •  Left Atrium: The atrium is moderately dilated  •  Atrial Septum: The septum bows into the right atrium, suggesting increased left atrial pressure  •  Aortic Valve: The aortic valve is possibly bicuspid  The leaflets are severely calcified  There is severely reduced mobility  There is moderate to severe stenosis  The aortic valve mean gradient is 19 mmHg  The dimensionless velocity index is 0 30  The aortic valve area is 0 86 cm2  The aortic valve velocity is increased due to stenosis but lower than expected due to the presence of decreased flow  •  Mitral Valve: There is mild annular calcification  There is mild regurgitation  •  Tricuspid Valve: There is mild regurgitation  The estimated right ventricular systolic pressure is 50 26 mmHg    •  Pericardium: There is a small pericardial effusion circumferential to the heart  There is no echocardiographic evidence of tamponade  11/11/22 CT AP:  IMPRESSION:     No pulmonary arterial embolism      At least moderate CHF      Small bilateral pleural effusions      Complex 3 3 cm right lower pole renal mass suspicious for renal cell carcinoma  Indeterminate focal area of right hepatic enhancement measuring 10 mm      Hypoenhancing right kidney secondary to advanced renal vascular disease      Advanced atherosclerotic changes; infrarenal abdominal aortic aneurysm measuring 4 5 cm with aortic stenosis and focal narrowing of the infrarenal aorta lumen to 10 mm      Large bowel containing ventral hernia      The study was marked in EPIC for immediate notification  11/11/22 no LE DVT by dopplers    Subjective and Interval Events:   Patient seen and examined  No new complaints  ROS:  10 point ROS negative except as specified above      Tele: reviewed    Objective:     Physical Exam:  BP (!) 196/70   Pulse 94   Temp 98 1 °F (36 7 °C) (Axillary)   Resp 15   Ht 5' 6" (1 676 m)   Wt 49 9 kg (110 lb)   SpO2 98%   BMI 17 75 kg/m²   Ranges:  Temp:  [97 4 °F (36 3 °C)-98 5 °F (36 9 °C)] 98 1 °F (36 7 °C)  HR:  [] 94  Resp:  [15-18] 15  BP: (128-199)/(54-95) 196/70    Weight (last 2 days)     Date/Time Weight    11/13/22 0548 49 9 (110)    11/12/22 1100 68 (150)           Intake/Output Summary (Last 24 hours) at 11/13/2022 1123  Last data filed at 11/13/2022 3751  Gross per 24 hour   Intake 987 48 ml   Output 3875 ml   Net -2887 52 ml     Constitutional: awake/alert, bipap being removed now  Ears/nose/mouth/throat: atraumatic  CV: irregular, +JVD  Resp: Decreased breath sounds BL  GI: Soft, NTND  MSK: no swollen joints in exposed areas  Extr: +1 LE edema, WWP  Skin: dry and intact in exposed areas    Labs/Imaging/Data:   Results from last 7 days   Lab Units 11/12/22  1127 11/11/22  2328 11/11/22  1056   WBC Thousand/uL 9 72 7 07 7 24 HEMOGLOBIN g/dL 13 8 13 5 14 2   HEMATOCRIT % 43 5 43 1 44 6   PLATELETS Thousands/uL 118* 104* 115*   NEUTROS PCT %  --   --  87*   MONOS PCT %  --   --  8      Results from last 7 days   Lab Units 11/13/22  0904 11/13/22  0544 11/12/22  1304 11/11/22  1056   SODIUM mmol/L 140 138 139 138   POTASSIUM mmol/L 3 5 3 6 3 5 4 9   CHLORIDE mmol/L 91* 92* 96 98   CO2 mmol/L >45* 44* 41* 36*   ANION GAP mmol/L  --  2* 2* 4   BUN mg/dL 22 24 30* 46*   CREATININE mg/dL 0 79 0 80 0 81 1 15   CALCIUM mg/dL 8 8 8 5 9 1 9 4   GLUCOSE RANDOM mg/dL 137 150* 126 138   ALT U/L  --  156* 204* 289*   AST U/L  --  70* 87*  --    ALK PHOS U/L  --  67 82 84   ALBUMIN g/dL  --  2 3* 2 7* 2 8*   TOTAL BILIRUBIN mg/dL  --  1 48* 1 58* 1 72*      Results from last 7 days   Lab Units 11/13/22  0544 11/11/22  1056   MAGNESIUM mg/dL 1 4* 2 0   PHOSPHORUS mg/dL 2 8  --        Results from last 7 days   Lab Units 11/13/22  0544 11/13/22  0015 11/12/22  1735 11/12/22  1127 11/12/22  0529 11/11/22  2328 11/11/22  1056   INR   --   --   --  1 11  --  1 26* 1 22*   PTT seconds 68* 78* 86* 51* 24 25 26            Results from last 7 days   Lab Units 11/11/22  1304 11/11/22  1119   LACTIC ACID mmol/L 1 4 2 2*      ABG:  Results from last 7 days   Lab Units 11/13/22  0624 11/13/22  0601   PH ART  7 391 7 396   PCO2 ART mm Hg 74 8* 81 4*   PO2 ART mm Hg 81 2 65 5*   HCO3 ART mmol/L 44 4* 48 8*   BASE EXC ART mmol/L 15 9 19 7   ABG SOURCE   --  Radial, Left      VBG:  Results from last 7 days   Lab Units 11/13/22  0601   ABG SOURCE  Radial, Left     No results found for: LDH    heparin (porcine), 3-20 Units/kg/hr (Order-Specific), Last Rate: 18 Units/kg/hr (11/13/22 2807)  nitroGLYcerin, 5-200 mcg/min, Last Rate: 200 mcg/min (11/13/22 3011)      Current Facility-Administered Medications   Medication Dose Route Frequency Provider Last Rate   • albuterol  2 5 mg Nebulization Q4H PRN Meg Mckeon MD     • digoxin  125 mcg Intravenous Daily Winter Yvonne Daniel MD     • furosemide  80 mg Intravenous BID (diuretic) Gonzales Duron MD     • heparin (porcine)  3-20 Units/kg/hr (Order-Specific) Intravenous Titrated Dary Lees MD 18 Units/kg/hr (11/13/22 0252)   • heparin (porcine)  1,950 Units Intravenous Q1H PRN Dary Lees MD     • heparin (porcine)  3,900 Units Intravenous Q1H PRN Dary Lees MD     • hydrALAZINE  10 mg Intravenous Q8H Gina Juárez MD     • isosorbide dinitrate  20 mg Oral TID after meals Toioana Cardozo DO     • labetalol  10 mg Intravenous Q6H Beatrice Segovia DO     • magnesium sulfate  2 g Intravenous Once Javon Allen DO     • nitroGLYcerin  5-200 mcg/min Intravenous Titrated Go Oseguera  mcg/min (11/13/22 104)       Invasive Devices  Report    Peripheral Intravenous Line  Duration           Peripheral IV 11/11/22 Right Forearm 1 day    Peripheral IV 11/12/22 Left;Ventral (anterior) Forearm <1 day          Drain  Duration           Urethral Catheter 16 Fr  1 day                No current outpatient medications     Counseling / Coordination of Care: Total floor / unit time spent today 30 minutes  Greater than 50% of total time was spent with the patient and / or family counseling and / or coordination of care  A description of the counseling / coordination of care: we discussed diagnoses, recent as well as older studies, labs, and all changes in cardiac treatment plan  All patient questions were answered  Thank you for the opportunity to participate in the care of this patient      Gonzales Duron MD  Attending Physician  Advanced Heart Failure and Transplant Cardiology  [facility]

## 2022-11-13 NOTE — PROGRESS NOTES
Daily Progress Note - Critical Care   Manual Hatchet 71 y o  female MRN: [id number]  Unit/Bed#: [facility] 201-01 Encounter: [id number]        ----------------------------------------------------------------------------------------  HPI/24hr events: Manual Hatchet is a 71 y o  female who presents with progressive fatigue x 6 weeks  Patient has not seen a physician in 40 years  Unvaccinated - no flu/tdap/pneumococcal vaccines  Denies recent sick contacts  Brought in by family out of concern for progressive weakness and sleeping for longer periods of time  Patient lives with 4 foster children ages 15-34 but they are all mentally disabled and are unable to help take care of her  They noted that the patient fell asleep in her car at one point when she was sitting in her car parked  Patient is typically fully capable of all ADLs and IADLs  She has also had 2 falls at home but denies LOC or headstrike  She denies lightheadedness or dizziness  Social: 87 pack year smoker, quit 6 weeks ago but did not say why or tell family why  Never rec drug user, including IDVU  Does not drink alcohol  Has three children  All present at bedside during interview  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 ED AM of 11/11 and EKG showed aflutter  Trops negative, proBNP 21k  Bedside echo showed severely impaired EF  BLE swelling present, reported as 4+, so duplex were ordered for BLE and negative for DVT  D-dimer (+), CTA PE ordered  Negative for PE  However, it showed radiographic findings of pulm HTN, bilateral pulmonary effusions, and 3 cm renal mass c/f renal cell carcinoma  Upon presentation to [facility] patient arrived on nitro drip, heparin drip, and was continued on biPAP  VS were HR 100s afib, /64, satting 92% on 18/10 at 50%       11/13  Overnight pt tolerated BIPAP well  Increased hydralazine 5 -> 10mg  NTG gtt 190  BP still 355F/583N systolic  ABG showed pCO2 77, pH normal    ---------------------------------------------------------------------------------------  SUBJECTIVE  Pt seen and examined at bedside  Patient on BiPAP  Denies chest pain shortness of breath or any complaints  Review of Systems  Review of systems was reviewed and negative unless stated above in HPI/24-hour events   ---------------------------------------------------------------------------------------  Assessment and Plan:    #  Acute heart failure, LVEF 30% - tachycardia-mediated vs  aortic stenosis mediated  #  Atrial fibrillation/flutter  #  Hypertensive emergency  #  Primary respiratory acidosis w/compensatory metabolic alkalosis   #  Severe aortic stenosis, bicuspid  #  Pulmonary hypertension - group 2 vs 3  #  Suspected renal cell carcinoma (3 3 cm R pole mass)  #  Liver mass 1 cm   #  Large non-incarcerated ventral hernia (70o11a0 cm)  #  Abdominal aortic aneurysm   #  Elevated liver transaminases - suspect congestive hepatopathy from CHF  #  Hyperlipidemia  #  Former tobacco smoker - appx 87 pack years (1-2 ppd smoker)  #   Ambulatory dysfunction    Neuro:   • Diagnosis: No acute issues  o Plan: Delirium precautions  o CAM-ICU daily    CV:   • Diagnosis: Acute HFrEF 30%, severe global hypokinesis  o Admission proBNP 21k  o Confirmed on 11/12 [facility] admission echo  o S/p cardizem drip and started on digoxin load while at [facility] ED  o Troponins slightly elevated to 60s - likely 2/2 demand ischemia  o Plan:  - 80 mg lasix IV BID  - Nitro GTT --> wean off as able  - Increase to 20mg isordil q8h   - BiPAP for preload and afterload reduction  - Target net -2L for today  - Saunders in place for strict I/O  - NPO while on bipap - will place on cardiac/fluid restriction when able to come off of bipap  - Further ischemic workup eg cardiac cath - will appreciate cardiology's assistance with timing  • Diagnosis: Atrial fibrillation/flutter  o Seen on admission to [facility] = flutter; now in afib rate 100s  o [id number] is at least 4  o TSH normal  o A1C 5 9, +hyperlipidemia  o Plan:  o AC: heparin gtt (in event of biopsy)  o Rhythm: Continue digoxin load 125 mcg daily   o Rate: Holding coreg initiation in setting of acute heart failure per cards  o Continuous cardiac monitoring  • Diagnosis: Severe stenosis of bicuspid aortic valve   o Seen on 11/12 echo  o Plan: Await initiation of beta blocker per HF recs  Holding for acute HF exacerbation  • Diagnosis: Hypertensive emergency  o Admission /98, went as high as   o Plan: Target /90 within first 24 hrs   o Add Labetalol 10 q6 - withhold BP less than 160      Pulm:  • Diagnosis: Combined hypoxic hypercarbic respiratory failure  o Plan: BiPAP 18/10, 50% FiO2 around the clock - wean to NCs tart at 6L - if fails check vbg  o Target O2 > 92%  o Respiratory protocol  • Diagnosis: 87 pack year smoker  o Quit 6 weeks PTA  o Plan: Outpatient spirometry/PFTs  o Needs flu, pneumonia vaccines prior to discharge       GI:   • Diagnosis: Ventral hernia  o 17 x 14 x 5 cm  o Monitor abdominal exams for signs of incarceration  o Plan: Implement bowel regimen when starting a po diet  • Diagnosis: GI ppx   o Plan: Not indicated  • Diagnosis: Elevated transaminases - likely heart failure related congestive hepatopathy  o Plan: Treat HF and monitor for improvement of transaminases, then if needed, can initiate further workup eg chronic and acute hepatitis panels      :   • Diagnosis: No acute issues  o Plan: I/O strict with case in place for CHF  o Lasix 80 bid      F/E/N:   F: None  E: Goal Mg>2 0, Phos>3 0, K>4 0  BMP checks q12h while diuresing  • N: NPO, sips with meds while on BiPAP   When off biPAP, will change to 1 8L fluid restriction, 2g Na restriction, cardiac diet - trial off BIPAP today - if tolerates speech eval for diet      Heme/Onc:   • Diagnosis: DVT ppx  o Plan: Already on hep gtt for [facility] for afib  o Maintain on hep gtt in event of need for onc biopsy  • Diagnosis: Positive D-dimer - suspect in setting of CHF  o Negative CTA PE, negative duplex studies, negative COVID  o Plan: No further workup  o In absence of fevers/ leukocytosis, will not treat for cellulitis of lower extremities unless develops gely drainage/open wounds      Endo:   • Diagnosis: Prediabetes A1C 5 9  o Plan: Monitor BMPs  If elevated BG can start SSI    ID:   • Diagnosis: No acute issues  o Flu/COVID/RSV negative  o F/u peripheral bcx x2  o UA on admission no bacteria, nitrites, or leukocytes        MSK/Skin:   • Diagnosis: Lower extremity erythema 2/2 CHF  o Plan: Wound care consult    Patient appropriate for transfer out of the ICU today?: No  Disposition: Continue Stepdown Level 1 level of care   Code Status: Level 1 - Full Code  ---------------------------------------------------------------------------------------  ICU CORE MEASURES    Prophylaxis   VTE Pharmacologic Prophylaxis: Heparin Drip  VTE Mechanical Prophylaxis: sequential compression device  Stress Ulcer Prophylaxis: Prophylaxis Not Indicated         Invasive Devices Review  Invasive Devices  Report    Peripheral Intravenous Line  Duration           Peripheral IV 22 Right Forearm 1 day    Peripheral IV 22 Left;Ventral (anterior) Forearm <1 day          Drain  Duration           Urethral Catheter 16 Fr  <1 day              Can any invasive devices be discontinued today?  Not applicable  ---------------------------------------------------------------------------------------  OBJECTIVE    Vitals   Vitals:    22 0215 22 0220 22 0548 22 0630   BP: (!) 181/55 165/61  170/77   Pulse: 90 92  (!) 108   Resp:       Temp:    97 8 °F (36 6 °C)   TempSrc:    Axillary   SpO2: 92% 93%  96%   Weight:   49 9 kg (110 lb)    Height:         Temp (24hrs), Av 8 °F (36 6 °C), Min:97 4 °F (36 3 °C), Max:98 5 °F (36 9 °C)  Current: Temperature: 97 8 °F (36 6 °C)      108   170/77    Respiratory:  SpO2: SpO2: 96 %, SpO2 Activity:         Invasive/non-invasive ventilation settings   Respiratory  Report   Lab Data (Last 4 hours)      11/13 0624            pH, Arterial       7 391             pCO2, Arterial       74 8             pO2, Arterial       81 2             HCO3, Arterial       44 4             Base Excess, Arterial       15 9                  O2/Vent Data     None                Physical Exam  Vitals and nursing note reviewed  Constitutional:       General: She is awake  Appearance: She is cachectic  HENT:      Head: Normocephalic and atraumatic  Mouth/Throat:      Mouth: Mucous membranes are moist    Eyes:      Extraocular Movements: Extraocular movements intact  Cardiovascular:      Rate and Rhythm: Tachycardia present  Rhythm regularly irregular  Heart sounds: Murmur heard  Systolic murmur is present  Pulmonary:      Effort: No respiratory distress  Breath sounds: Normal breath sounds  Comments: On BIPAP  Abdominal:      Palpations: Abdomen is soft  Tenderness: There is no abdominal tenderness  Hernia: A hernia is present  Hernia is present in the ventral area  Musculoskeletal:      Cervical back: Normal range of motion  Right lower leg: Pitting Edema present  Left lower leg: Pitting Edema present  Skin:     General: Skin is warm and dry  Neurological:      General: No focal deficit present  Mental Status: She is alert and oriented to person, place, and time               Laboratory and Diagnostics:  Results from last 7 days   Lab Units 11/12/22  1127 11/11/22  2328 11/11/22  1056   WBC Thousand/uL 9 72 7 07 7 24   HEMOGLOBIN g/dL 13 8 13 5 14 2   HEMATOCRIT % 43 5 43 1 44 6   PLATELETS Thousands/uL 118* 104* 115*   NEUTROS PCT %  --   --  87*   MONOS PCT %  --   --  8     Results from last 7 days   Lab Units 11/13/22  0544 11/12/22  1304 11/11/22  1056   SODIUM mmol/L 138 139 138   POTASSIUM mmol/L 3 6 3 5 4 9   CHLORIDE mmol/L 92* 96 98   CO2 mmol/L 44* 41* 36*   ANION GAP mmol/L 2* 2* 4   BUN mg/dL 24 30* 46*   CREATININE mg/dL 0 80 0 81 1 15   CALCIUM mg/dL 8 5 9 1 9 4   GLUCOSE RANDOM mg/dL 150* 126 138   ALT U/L 156* 204* 289*   AST U/L 70* 87*  --    ALK PHOS U/L 67 82 84   ALBUMIN g/dL 2 3* 2 7* 2 8*   TOTAL BILIRUBIN mg/dL 1 48* 1 58* 1 72*     Results from last 7 days   Lab Units 11/13/22  0544 11/11/22  1056   MAGNESIUM mg/dL 1 4* 2 0   PHOSPHORUS mg/dL 2 8  --       Results from last 7 days   Lab Units 11/13/22  0544 11/13/22  0015 11/12/22  1735 11/12/22  1127 11/12/22  0529 11/11/22  2328 11/11/22  1056   INR   --   --   --  1 11  --  1 26* 1 22*   PTT seconds 68* 78* 86* 51* 24 25 26          Results from last 7 days   Lab Units 11/11/22  1304 11/11/22  1119   LACTIC ACID mmol/L 1 4 2 2*     ABG:  Results from last 7 days   Lab Units 11/13/22  0624 11/12/22 2033   PH ART  7 391 7 387   PCO2 ART mm Hg 74 8* 77 7*   PO2 ART mm Hg 81 2 66 9*   HCO3 ART mmol/L 44 4* 45 7*   BASE EXC ART mmol/L 15 9 16 7   ABG SOURCE   --  Radial, Left     VBG:  Results from last 7 days   Lab Units 11/12/22 2033   ABG SOURCE  Radial, Left     Results from last 7 days   Lab Units 11/11/22  1056   PROCALCITONIN ng/ml 0 19       Micro  Results from last 7 days   Lab Units 11/11/22  1119   BLOOD CULTURE  No Growth at 24 hrs  No Growth at 24 hrs  EKG: n/a  Imaging: IMPRESSION:     Worsening vascular congestion and increased moderate right pleural effusion  Unchanged moderate left pleural effusion  •  Left Ventricle: Left ventricular cavity size is normal  Wall thickness is moderately increased  There is severe concentric hypertrophy  The left ventricular ejection fraction is 30%  Systolic function is severely reduced  There is severe global hypokinesis    •  Right Ventricle: Right ventricular cavity size is normal  Systolic function is normal  Wall thickness is increased  •  Left Atrium: The atrium is moderately dilated  •  Atrial Septum: The septum bows into the right atrium, suggesting increased left atrial pressure  •  Aortic Valve: The aortic valve is possibly bicuspid  The leaflets are severely calcified  There is severely reduced mobility  There is moderate to severe stenosis  The aortic valve mean gradient is 19 mmHg  The dimensionless velocity index is 0 30  The aortic valve area is 0 86 cm2  The aortic valve velocity is increased due to stenosis but lower than expected due to the presence of decreased flow  •  Mitral Valve: There is mild annular calcification  There is mild regurgitation  •  Tricuspid Valve: There is mild regurgitation  The estimated right ventricular systolic pressure is 61 91 mmHg  •  Pericardium: There is a small pericardial effusion circumferential to the heart  There is no echocardiographic evidence of tamponade  I have personally reviewed pertinent reports  and I have personally reviewed pertinent films in PACS    Intake and Output  I/O       11/11 0701 11/12 0700 11/12 0701 11/13 0700 11/13 0701 11/14 0700    P  O   240     I V  (mL/kg)  475 9 (9 5)     Total Intake(mL/kg)  715 9 (14 3)     Urine (mL/kg/hr)  4080     Total Output  4080     Net  -3364 1            Unmeasured Urine Occurrence  1 x             Height and Weights   Height: 5' 6" (167 6 cm)     Body mass index is 17 75 kg/m²  Weight (last 2 days)     Date/Time Weight    11/13/22 0548 49 9 (110)    11/12/22 1100 68 (150)            Nutrition       Diet Orders   (From admission, onward)             Start     Ordered    11/12/22 1530  Diet NPO; Sips with meds  Diet effective now        References:    Nutrtion Support Algorithm Enteral vs  Parenteral   Question Answer Comment   Diet Type NPO    NPO Except: Sips with meds    RD to adjust diet per protocol?  Yes        11/12/22 1530                    Active Medications  Scheduled Meds:  Current Facility-Administered Medications   Medication Dose Route Frequency Provider Last Rate   • albuterol  2 5 mg Nebulization Q4H PRN Mckenna Elise MD     • digoxin  125 mcg Intravenous Daily Carla Phillip MD     • furosemide  80 mg Intravenous BID (diuretic) Iftikhar Alvarenga MD     • heparin (porcine)  3-20 Units/kg/hr (Order-Specific) Intravenous Titrated Tita De Los Santos MD 18 Units/kg/hr (11/12/22 1236)   • heparin (porcine)  1,950 Units Intravenous Q1H PRN Tita De Los Santos MD     • heparin (porcine)  3,900 Units Intravenous Q1H PRN Tita De Los Santos MD     • hydrALAZINE  10 mg Intravenous Q8H Lidia Maria MD     • isosorbide dinitrate  10 mg Oral TID after meals Carla Phillip MD     • nitroGLYcerin  5-200 mcg/min Intravenous Titrated Carla Phillip  mcg/min (11/13/22 5164)     Continuous Infusions:  heparin (porcine), 3-20 Units/kg/hr (Order-Specific), Last Rate: 18 Units/kg/hr (11/12/22 1236)  nitroGLYcerin, 5-200 mcg/min, Last Rate: 190 mcg/min (11/13/22 6054)      PRN Meds:   albuterol, 2 5 mg, Q4H PRN  heparin (porcine), 1,950 Units, Q1H PRN  heparin (porcine), 3,900 Units, Q1H PRN        Allergies   No Known Allergies  ---------------------------------------------------------------------------------------  Advance Directive and Living Will:      Power of :    POLST:    ---------------------------------------------------------------------------------------  Care Time Delivered:   Upon my evaluation, this patient had a high probability of imminent or life-threatening deterioration due to CHF, which required my direct attention, intervention, and personal management  I have personally provided 30 minutes (15 to 30) of critical care time, exclusive of procedures, teaching, family meetings, and any prior time recorded by providers other than myself  Ariela Ray DO      Portions of the record may have been created with voice recognition software    Occasional wrong word or "sound a like" substitutions may have occurred due to the inherent limitations of voice recognition software    Read the chart carefully and recognize, using context, where substitutions have occurred

## 2022-11-14 NOTE — DISCHARGE INSTR - OTHER ORDERS
Skin care Plan:  1-Cleanse sacro-buttocks with soap and water  Apply Allevyn foam  Jarad with T for treatment  Change every three days and PRN  2-Turn/reposition q2h or when medically stable for pressure re-distribution on skin   3-Elevate heels to offload pressure  4-Moisturize skin daily with skin nourishing cream  5-Ehob cushion in chair when out of bed  6-Hydraguard to bilateral heels BID and PRN  7-Apply dry dressing to right dorsal foot daily

## 2022-11-14 NOTE — CASE MANAGEMENT
Case Management Assessment & Discharge Planning Note    Patient name Wilhelmenia Boast  Location [facility] 201/[facility] 675-78 MRN [id number]  : 1950 Date 2022       Current Admission Date: 2022  Current Admission Diagnosis:Acute respiratory failure with hypoxia [facility])   Patient Active Problem List    Diagnosis Date Noted   • Acute respiratory failure with hypoxia ([facility] [address] ) 2022   • Atrial flutter with rapid ventricular response ([facility] [address] ) 2022   • Renal mass 2022   • Volume overload 2022   • Hernia of abdominal wall 2022   • Aneurysm of infrarenal abdominal aorta 2022      LOS (days): 2  Geometric Mean LOS (GMLOS) (days):   Days to GMLOS:     OBJECTIVE:    Risk of Unplanned Readmission Score: 10 59         Current admission status: Inpatient       Preferred Pharmacy:   [facility] [address] [place] [address]  [address] [address]  Phone: [phone] Fax: [phone]    Primary Care Provider: No primary care provider on file  Primary Insurance: MEDICARE  Secondary Insurance:     ASSESSMENT:  Active Health Care Proxies    There are no active Health Care Proxies on file  Readmission Root Cause  30 Day Readmission: No    Patient Information  Admitted from[de-identified] Home  Mental Status: Alert  During Assessment patient was accompanied by: Not accompanied during assessment  Assessment information provided by[de-identified] Patient  Primary Caregiver: Self  Support Systems: Family members  [place] of Residence: [address] do you live in?: [address] entry access options   Select all that apply : Stairs  Number of steps to enter home : One Flight (13 steps)  Type of Current Residence: [facility]  In the last 12 months, was there a time when you were not able to pay the mortgage or rent on time?: No  In the last 12 months, was there a time when you did not have a steady place to sleep or slept in a shelter (including now)?: No  Homeless/housing insecurity resource given?: N/A  Living Arrangements: Other (Comment) (currently has 4 foster children)  Is patient a ?: No    Activities of Daily Living Prior to Admission  Functional Status: Independent  Completes ADLs independently?: Yes  Ambulates independently?: Yes  Does patient use assisted devices?: No  Does patient currently own DME?: No  Does patient have a history of Outpatient Therapy (PT/OT)?: Yes  Does the patient have a history of Short-Term Rehab?: No  Does patient have a history of HHC?: No  [facility]         Patient Information Continued  Income Source: Employed (full-time )  Does patient have prescription coverage?: No  Within the past 12 months, you worried that your food would run out before you got the money to buy more : Never true  Within the past 12 months, the food you bought just didn't last and you didn't have money to get more : Never true  Food insecurity resource given?: N/A  Does patient receive dialysis treatments?: No  Does patient have a history of substance abuse?: No  Does patient have a history of Mental Health Diagnosis?: No         Means of Transportation  Means of Transport to Appts[de-identified] Drives Self  In the past 12 months, has lack of transportation kept you from medical appointments or from getting medications?: No  In the past 12 months, has lack of transportation kept you from meetings, work, or from getting things needed for daily living?: No  Was application for public transport provided?: N/A        DISCHARGE DETAILS:    Discharge planning discussed with[de-identified] patient  Freedom of Choice: Yes     CM contacted family/caregiver?: No- see comments (declined)  Were Treatment Team discharge recommendations reviewed with patient/caregiver?: Yes  Did patient/caregiver verbalize understanding of patient care needs?: Yes  Were patient/caregiver advised of the risks associated with not following Treatment Team discharge recommendations?: Yes    Contacts  Patient Contacts: Ata Figueroa, daughter  Relationship to Patient[de-identified] Family  Contact Method: Phone  Phone Number: [phone]  Reason/Outcome: Emergency Contact                        Treatment Team Recommendation: Other (TBD)  Discharge Destination Plan[de-identified] Other (TBD -- likely home)  Transport at Discharge : Family                Patient/caregiver received discharge checklist   Content reviewed  Patient/caregiver encouraged to participate in discharge plan of care prior to discharge home  CM reviewed d/c planning process including the following: identifying help at home, patient preference for d/c planning needs, Discharge Lounge, [facility] Meds to Bed program, availability of treatment team to discuss questions or concerns patient and/or family may have regarding understanding medications and recognizing signs and symptoms once discharged  CM also encouraged patient to follow up with all recommended appointments after discharge  Patient advised of importance for patient and family to participate in managing patient’s medical well being  Additional Comments: Patient is fully independent at baseline, drives, no history of HHC or STR  Patient is not vaccinated for COVID  Daughter will provide transportation at discharge  CM will continue to follow for discharge needs

## 2022-11-14 NOTE — PROGRESS NOTES
Heart Failure Progress Note - Rodney Hernandez 71 y o  female MRN: [id number]    Unit/Bed#: [facility] 201-01 Encounter: [id number]      Assessment:  Principal Problem:    Acute respiratory failure with hypoxia (HCC)  Active Problems:    Atrial flutter with rapid ventricular response (HCC)    Renal mass    Volume overload    Hernia of abdominal wall    Aneurysm of infrarenal abdominal aorta    Newly diagnosed HFrEF, EF 30%, NYHA 3-4  No prior cardiac hx, however has not seen a physician in >40 years  Echo: EF 30%, global hypokinesis, NL RV function, mod dilated LA, ? bicuspid AV with mod-severe AS, LVIDd5 1 cm, RVSP 55  Possible etiology: tachycardia mediated (aflutter with RVR) vs ischemic (tobacco use, HTN) vs hypertensive or valvular (AS)  Currently appears volume up  Responding well to diuresis  ABG appears primary respiratory acidosis, compensated    Atrial flutter with RVR  POA  S/p digoxin load, now on 125 mcg EOD + coreg 3 125 mg bid  AC with IV heparin gtt  Telemetry: NSR, converted last night    Hypertension   Likely longstanding hypertension given LVH findings on EKG and Echo  Does have a wide pulse pressure; ?age vs nitroglycerin    Moderate-severe AS  Hypercarbic respiratory failure  Increased pulmonary artery pressure  Renal mass  AAA, 4 5 cm  Tobacco smoker - quit in September 2022    Plan:  1  Continue with IV lasix 80 mg bid today  2  Continue to wean off IV nitroglycerin  3  Will increase isordil to 30 mg tid  4  Will continue to titrate afterload reducing agents, however would do gradually given wide pulse pressure  5  Continue hydralazine 50 mg tid  6  Continue coreg at 3 125 mg bid today  7  Continue IV heparin, will need to transition to [facility] prior to discharge  May need further intervention (renal mass, LHC), therefore would continue with heparin for now  8  Ischemic evaluation for cardiomyopathy with C and further work up for aortic stenosis in the future   However, will await decision on further work up on renal mass       Subjective:   Patient seen and examined  No significant events overnight  Objective:     Vitals: Blood pressure (!) 167/35, pulse 70, temperature 97 7 °F (36 5 °C), temperature source Oral, resp  rate 15, height 5' 6" (1 676 m), weight 50 8 kg (112 lb 1 6 oz), SpO2 96 %  , Body mass index is 18 09 kg/m² ,   Orthostatic Blood Pressures    Flowsheet Row Most Recent Value   Blood Pressure 167/35 filed at 11/14/2022 0900   Patient Position - Orthostatic VS Lying filed at 11/13/2022 3815            Intake/Output Summary (Last 24 hours) at 11/14/2022 1308  Last data filed at 11/14/2022 0701  Gross per 24 hour   Intake 655 88 ml   Output 1600 ml   Net -944 12 ml       Telemetry: sinus rhythm      Physical Exam:  Physical Exam  Vitals and nursing note reviewed  Constitutional:       General: She is not in acute distress  Appearance: She is ill-appearing  Comments: cachectic   HENT:      Head: Normocephalic  Nose: Nose normal    Eyes:      Pupils: Pupils are equal, round, and reactive to light  Neck:      Comments: JVD+  Cardiovascular:      Rate and Rhythm: Normal rate and regular rhythm  Heart sounds: Murmur (systolic) heard  Pulmonary:      Breath sounds: Rales present  Comments: 3L midflow  Abdominal:      Palpations: Abdomen is soft  Musculoskeletal:      Right lower leg: Edema (trace) present  Left lower leg: No edema  Neurological:      General: No focal deficit present     Psychiatric:         Mood and Affect: Mood normal            Medications:      Current Facility-Administered Medications:   •  albuterol inhalation solution 2 5 mg, 2 5 mg, Nebulization, Q4H PRN, Jose Alfredo Abdi MD  •  carvedilol (COREG) tablet 3 125 mg, 3 125 mg, Oral, BID With Meals, Sravan Daugherty MD, 3 125 mg at 11/14/22 0652  •  [START ON 11/15/2022] digoxin (LANOXIN) injection 125 mcg, 125 mcg, Intravenous, Every Other Day, Sravan Daugherty MD  •  furosemide (LASIX) injection 20 mg, 20 mg, Intravenous, Once, Anu Perez MD  •  furosemide (LASIX) injection 80 mg, 80 mg, Intravenous, BID (diuretic), Anu Perez MD  •  heparin (porcine) 25,000 units in 0 45% NaCl 250 mL infusion (premix), 3-20 Units/kg/hr (Order-Specific), Intravenous, Titrated, Óscar Oliver MD, Last Rate: 11 7 mL/hr at 11/14/22 0400, 18 Units/kg/hr at 11/14/22 0400  •  heparin (porcine) injection 1,950 Units, 1,950 Units, Intravenous, Q1H PRN, Óscar Oliver MD, 1,950 Units at 11/12/22 1235  •  heparin (porcine) injection 3,900 Units, 3,900 Units, Intravenous, Q1H PRN, Óscar Oliver MD  •  hydrALAZINE (APRESOLINE) tablet 50 mg, 50 mg, Oral, Q8H [address], Ashley Garcia MD, 50 mg at 11/14/22 4875  •  insulin lispro (HumaLOG) 100 units/mL subcutaneous injection 1-5 Units, 1-5 Units, Subcutaneous, TID AC **AND** Fingerstick Glucose (POCT), , , TID AC, Chayito Lake MD  •  isosorbide dinitrate (ISORDIL) tablet 30 mg, 30 mg, Oral, Q8H, Anu Perez MD  •  nitroGLYcerin (TRIDIL) 50 mg in 250 mL infusion (premix), 5-200 mcg/min, Intravenous, Titrated, Chayito Lake MD, Last Rate: 15 mL/hr at 11/14/22 1130, 50 mcg/min at 11/14/22 1130     Labs & Results:        Results from last 7 days   Lab Units 11/14/22  0559 11/13/22  1333 11/12/22  1127   WBC Thousand/uL 9 06 13 03* 9 72   HEMOGLOBIN g/dL 11 7 12 1 13 8   HEMATOCRIT % 38 7 37 9 43 5   PLATELETS Thousands/uL 89* 99* 118*     Results from last 7 days   Lab Units 11/12/22  1304   TRIGLYCERIDES mg/dL 106   HDL mg/dL 42*     Results from last 7 days   Lab Units 11/14/22  0535 11/13/22  1407 11/13/22  0904 11/13/22  0544 11/12/22  1304 11/11/22  1056   POTASSIUM mmol/L 3 9 3 2* 3 5 3 6 3 5 4 9   CHLORIDE mmol/L 88* 89* 91* 92* 96 98   CO2 mmol/L >45* >45* >45* 44* 41* 36*   BUN mg/dL 19 22 22 24 30* 46*   CREATININE mg/dL 0 69 0 76 0 79 0 80 0 81 1 15   CALCIUM mg/dL 8 7 8 6 8 8 8 5 9 1 9 4   ALK PHOS U/L  --   --   --  67 82 84   ALT U/L --   --   --  156* 204* 289*   AST U/L  --   --   --  70* 87*  --      Results from last 7 days   Lab Units 11/14/22  0607 11/13/22  0544 11/13/22  0015 11/12/22  1735 11/12/22  1127 11/12/22  0529 11/11/22  2328 11/11/22  1056   INR   --   --   --   --  1 11  --  1 26* 1 22*   PTT seconds 68* 68* 78*   < > 51*   < > 25 26    < > = values in this interval not displayed       Results from last 7 days   Lab Units 11/14/22  0535 11/13/22  0544 11/11/22  1056   MAGNESIUM mg/dL 1 7 1 4* 2 0       EKG personally reviewed by Serjio Rm MD

## 2022-11-14 NOTE — PROGRESS NOTES
Spiritual Care Progress Note    2022  Patient: Joanne Sickle : 1950  Admission Date & Time: 2022 0836  MRN: [id number] CSN: [id number]      [facility] visited with pt's daughter, briefly, while doing rounds on the unit (pt's daughter, Maggie Willams, was in 2nd floor waiting area)   and daughter chatted about mom and health   provided Chaplaincy education and offered to say prayers for pt and family as she recovers  Chaplains remain available                 Chaplaincy Interventions Utilized:   Empowerment: Provided chaplaincy education    Exploration: Explored emotional needs & resources, Explored relational needs & resources, and Explored spiritual needs & resources    Relationship Building: Cultivated a relationship of care and support       22 1300   Clinical Encounter Type   Visited With Family   Routine Visit Introduction

## 2022-11-14 NOTE — OCCUPATIONAL THERAPY NOTE
Occupational Therapy Evaluation     Patient Name: Joanne Weber  LFZDX'V Date: 11/14/2022  Problem List  Principal Problem:    Acute respiratory failure with hypoxia [facility])  Active Problems:    Atrial flutter with rapid ventricular response (HCC)    Renal mass    Volume overload    Hernia of abdominal wall    Aneurysm of infrarenal abdominal aorta    Past Medical History  No past medical history on file  Past Surgical History  Past Surgical History:   Procedure Laterality Date    APPENDECTOMY      TONSILLECTOMY      WRIST SURGERY           11/14/22 1140   OT Last Visit   OT Visit Date 11/14/22   Note Type   Note type Evaluation   Pain Assessment   Pain Assessment Tool 0-10   Pain Score No Pain   Restrictions/Precautions   Weight Bearing Precautions Per Order No   Other Precautions Cognitive; Chair Alarm;Multiple lines;O2;Telemetry; Fall Risk   Home Living   Type of [address] [place] [address] Multi-level; Able to live on main level with bedroom/bathroom;Stairs to enter with rails  ([address] per pt)   Bathroom Shower/Tub Tub/shower unit   [employer] Standard   Prior Function   Level of [place] Independent with ADLs; Independent with functional mobility; Independent with IADLS   Lives With   (foster children ages 15-34 (3) - all mentally disabled per chart)   Receives Help From Family   IADLs Independent with driving; Independent with meal prep; Independent with medication management   Falls in the last 6 months 1 to 4  (2 falls pta)   Vocational   ()   Lifestyle   Autonomy I adls and mobility -i iadls   Reciprocal Relationships supportive family however reports dtr lives [address]   Service to Others foster mother   Intrinsic Gratification mostly sedentary pta   General   Family/Caregiver Present No   Subjective   Subjective c/o fatigue   ADL   Eating Assistance 5  Supervision/Setup   Grooming Assistance 4  Minimal Assistance   UB Bathing Assistance 3  Moderate Assistance   LB Bathing Assistance 3  Moderate Assistance   UB Dressing Assistance 3  Moderate Assistance   LB Dressing Assistance 3  Moderate Assistance   Toileting Assistance  3  Moderate Assistance   Bed Mobility   Additional Comments oob in chair   Transfers   Sit to Stand 4  Minimal assistance   Stand to Sit 4  Minimal assistance   Functional Mobility   Functional Mobility 4  Minimal assistance   Additional Comments only able to tolerate short distances (just outside room and back)   Additional items Rolling walker   Balance   Static Sitting Fair +   Dynamic Sitting Fair   Static Standing Fair -   Dynamic Standing Poor +   Ambulatory Poor +   Activity Tolerance   Activity Tolerance Patient limited by fatigue;Treatment limited secondary to medical complications (Comment)   RUE Assessment   RUE Assessment WFL   LUE Assessment   LUE Assessment WFL   Cognition   Overall Cognitive Status Impaired   Arousal/Participation Arousable; Cooperative   Attention Attends with cues to redirect   Orientation Level Oriented to person;Oriented to place;Oriented to situation;Oriented to time  (general place and time (November 2022 but not date) and place with choices)   Memory Decreased short term memory;Decreased recall of recent events;Decreased recall of precautions   Following Commands Follows one step commands with increased time or repetition   Comments difficulty to obtain full reliable history as pt offering contradictory information on occasion - ie: states she does not have biological children but then states her dtr lives in [address]   Assessment   Limitation Decreased ADL status; Decreased UE strength;Decreased Safe judgement during ADL;Decreased cognition;Decreased endurance;Decreased self-care trans;Decreased high-level ADLs   Prognosis Good;Fair   Assessment Pt is a 71 y o  female who was [facility] to [facility] on 11/12/2022 with Acute respiratory failure with hypoxia ([facility] [address] )    Pt's problem list also includes PMH of tobacco abuse (2ppd) however pt has not seen MD in ~40yrs  At baseline pt was completing adls and mobility independently - I iadls  Pt lives in multilevel home with [employer] PRN per pt - resides with 4 foster children ages 15-34 all with intellectual disabilities and unable to offer any assistance  Currently pt requires moderate assist for overall ADLS and min assist for functional mobility/transfers  Pt currently presents with impairments in the following categories -steps to enter environment, limited home support, difficulty performing ADLS, difficulty performing IADLS , limited insight into deficits, decreased initiation and engagement , health management  and environment activity tolerance, endurance, standing balance/tolerance, memory, insight, safety , judgement , attention  and task initiation   These impairments, as well as pt's fatigue, SOB, AYALA, decreased caregiver support, risk for falls and home environment  limit pt's ability to safely engage in all baseline areas of occupation, includingbathing, dressing, toileting, functional mobility/transfers, community mobility, laundry , driving, house maintenance, medication management, meal prep, cleaning, care of children, social participation  and leisure activities  From OT standpoint, recommend inpt rehab upon D/C  OT will continue to follow to address the below stated goals     Goals   Patient Goals feel better   LTG Time Frame 10-14   Long Term Goal #1 refer to established goals below   Recommendation   OT Discharge Recommendation Post acute rehabilitation services   AM-PAC Daily Activity Inpatient   Lower Body Dressing 2   Bathing 2   Toileting 2   Upper Body Dressing 2   Grooming 3   Eating 4   Daily Activity Raw Score 15   Daily Activity Standardized Score (Calc for Raw Score >=11) 34 69   AM-PAC Applied Cognition Inpatient   Following a Speech/Presentation 3   Understanding Ordinary Conversation 4   Taking Medications 3   Remembering Where Things Are Placed or Put Away 3   Remembering List of 4-5 Errands 2   Taking Care of Complicated Tasks 2   Applied Cognition Raw Score 17   Applied Cognition Standardized Score 36 52   End of Consult   Education Provided Yes   Patient Position at End of Consult Bedside chair;Bed/Chair alarm activated; All needs within reach   Nurse Communication Nurse aware of consult       The patient's raw score on the AM-PAC Daily Activity inpatient short form is 15, standardized score is 34 69, less than 39 4  Patients at this level are likely to benefit from discharge to post-acute rehabilitation services  Please refer to the recommendation of the Occupational Therapist for safe discharge planning          OCCUPATIONAL THERAPY GOALS:    *Mod I adls after setup with use of AE PRN  *Mod I toileting and clothing management   *Mod I functional mobility and transfers to/from all surfaces with good dynamic balance and safety for participation in dynamic adls and iadl tasks   *Demonstrate good carryover with safe use of RW, EC/pacing and pursed lip breathing during functional tasks   *Assess DME needs   *Increase activity tolerance to 35-40 minutes for participation in adls and enjoyable activities  *Pt to participate in ongoing functional cognitive assessment with good attention/participation to assist with safe d/c recommendations      [facility]

## 2022-11-14 NOTE — PLAN OF CARE
Problem: OCCUPATIONAL THERAPY ADULT  Goal: Performs self-care activities at highest level of function for planned discharge setting  See evaluation for individualized goals  Description:            See flowsheet documentation for full assessment, interventions and recommendations  Note: Limitation: Decreased ADL status, Decreased UE strength, Decreased Safe judgement during ADL, Decreased cognition, Decreased endurance, Decreased self-care trans, Decreased high-level ADLs  Prognosis: Good, Fair  Assessment: Pt is a 71 y o  female who was admitted to [facility] on 11/12/2022 with Acute respiratory failure with hypoxia ([address] )   Pt's problem list also includes PMH of tobacco abuse (2ppd) however pt has not seen MD in ~40yrs  At baseline pt was completing adls and mobility independently - I iadls  Pt lives in multilevel home with [facility] PRN per pt - resides with 4 foster children ages 15-34 all with intellectual disabilities and unable to offer any assistance  Currently pt requires moderate assist for overall ADLS and min assist for functional mobility/transfers  Pt currently presents with impairments in the following categories -steps to enter environment, limited home support, difficulty performing ADLS, difficulty performing IADLS , limited insight into deficits, decreased initiation and engagement , health management  and environment activity tolerance, endurance, standing balance/tolerance, memory, insight, safety , judgement , attention  and task initiation    These impairments, as well as pt's fatigue, SOB, AYALA, decreased caregiver support, risk for falls and home environment  limit pt's ability to safely engage in all baseline areas of occupation, includingbathing, dressing, toileting, functional mobility/transfers, community mobility, laundry , driving, house maintenance, medication management, meal prep, cleaning, care of children, social participation  and leisure activities  From OT standpoint, recommend inpt rehab upon D/C  OT will continue to follow to address the below stated goals       OT Discharge Recommendation: Post acute rehabilitation services

## 2022-11-14 NOTE — PHYSICAL THERAPY NOTE
Physical Therapy Evaluation     Patient's Name: Silvana Peck    Admitting Diagnosis  CHF exacerbation [facility]) [I50 9]    Problem List  Patient Active Problem List   Diagnosis    Acute respiratory failure with hypoxia (HCC)    Atrial flutter with rapid ventricular response (HCC)    Renal mass    Volume overload    Hernia of abdominal wall    Aneurysm of infrarenal abdominal aorta       Past Medical History  No past medical history on file  Past Surgical History  Past Surgical History:   Procedure Laterality Date    APPENDECTOMY      TONSILLECTOMY      WRIST SURGERY            11/14/22 1143   PT Last Visit   PT Visit Date 11/14/22   Note Type   Note type Evaluation   Pain Assessment   Pain Assessment Tool 0-10   Pain Score No Pain   Restrictions/Precautions   Weight Bearing Precautions Per Order No   Other Precautions Cognitive; Chair Alarm; Bed Alarm;Multiple lines;Telemetry;O2;Fall Risk   Home Living   Type of Home House   Home Layout Multi-level; Able to live on main level with bedroom/bathroom  (13STE)   Additional Comments Pt is a poor/inconsistent historian, require clarification on home set up  Prior Function   Level of [place] Independent with ADLs; Independent with functional mobility; Independent with IADLS   Lives With   (foster 4 children aged 15-34, all mentally disabled per chart)   Falls in the last 6 months 1 to 4  (2)   Cognition   Overall Cognitive Status Impaired   Arousal/Participation Responsive   Attention Attends with cues to redirect   Memory Decreased recall of precautions;Decreased short term memory;Decreased recall of recent events   Following Commands Follows one step commands with increased time or repetition   Comments Pt with decreased safety awareness and insight  Inconsistent reports  Subjective   Subjective Pleasant and agreeable to participate in therapy session     RLE Assessment   RLE Assessment   (functionally 3/5)   LLE Assessment   LLE Assessment   (functionally 3/5) Bed Mobility   Additional Comments Found and left OOB in chair with chair alarm intact   Transfers   Sit to Stand 4  Minimal assistance   Additional items Assist x 1; Increased time required;Verbal cues   Stand to Sit 4  Minimal assistance   Additional items Assist x 1; Increased time required;Verbal cues   Additional Comments with RW   VC for hand placement and safety  Ambulation/Elevation   Gait pattern Excessively slow; Short stride; Foward flexed;Decreased foot clearance; Improper Weight shift  (unsteady)   Gait Assistance 4  Minimal assist   Additional items Assist x 1;Verbal cues; Tactile cues   Assistive Device Rolling walker   Distance 25 ft x 2   Balance   Static Sitting Fair +   Dynamic Sitting Fair   Static Standing Fair -   Dynamic Standing Poor +   Ambulatory Poor +   Activity Tolerance   Activity Tolerance Patient limited by fatigue   Medical Staff Made Aware OT Sophia [address]   Nurse Made Aware RN cleared pt to be seen by PT   Assessment   Prognosis Fair   Problem List Decreased strength;Decreased range of motion;Decreased endurance; Impaired balance;Decreased mobility; Decreased cognition;Decreased safety awareness; Impaired judgement   Assessment Pt seen for high complexity PT evaluation due to decrease in functional mobility status compared to baseline  Pt with active PT eval/treat orders at this time  Pt is a 71 y o  F who presented to [address] [place] [address] with acute respiratory failure with hypoxia on 11/12/22  Pt  has no past medical history on file  Pt resides with foster children in multilevel home with 13STE  Pt presents with decreased strength, balance, endurance that contribute to limitations in bed mobility, functional transfers, functional mobility  Pt requires Min A for all mobility at this time  Pt left upright in bedside chair with chair alarm intact and with all needs in reach  Pt will benefit from skilled therapy in order to address current impairments and functional limitations   PT to follow pt and recommending rehab once medically cleared  The patient's AM-PAC Basic Mobility Inpatient Short Form Raw Score is 14  A Raw score of less than or equal to 16 suggests the patient may benefit from discharge to post-acute rehabilitation services  Please also refer to the recommendation of the Physical Therapist for safe discharge planning  Barriers to Discharge Inaccessible home environment;Decreased caregiver support   Goals   Patient Goals to get better   STG Expiration Date 11/28/22   Short Term Goal #1 1  Pt will demonstrate ability to perform all aspects of bed mobility with I in order to increase independence and decrease burden on caregivers  2  Pt will demonstrate ability to perform functional transfers with Mod I in order to increase independence and decrease burden on caregivers  3  Pt will demonstrate ability to ambulate 200 ft with least restrictive AD with Mod I in order to return to mobility safely  4  Pt will demonstrate ability to negotiate full flight steps with/without HR and Mod I in order to return to household/community mobility safely  5  Pt will demonstrate improved balance by one grade order to decrease risk of falls  6  Pt will increase b/l LE strength by 1 grade in order to increase ease of functional mobility and transfers  Plan   Treatment/Interventions Functional transfer training;LE strengthening/ROM; Therapeutic exercise; Endurance training;Patient/family training;Cognitive reorientation;Equipment eval/education; Bed mobility;Gait training;Spoke to nursing   PT Frequency 2-3x/wk   Recommendation   PT Discharge Recommendation Post acute rehabilitation services   AM-PAC Basic Mobility Inpatient   Turning in Bed Without Bedrails 3   Lying on Back to Sitting on Edge of Flat Bed 3   Moving Bed to Chair 2   Standing Up From Chair 2   Walk in Room 2   Climb 3-5 Stairs 2   Basic Mobility Inpatient Raw Score 14   Basic Mobility Standardized Score 35 55   Highest Level Of Mobility -M Goal 4: Move to chair/commode   -[facility] Achieved 7: Walk 25 feet or more   Modified [place] Scale   Modified [place] Scale 4         Muna Guadalupe PT, DPT

## 2022-11-14 NOTE — PROGRESS NOTES
Daily Progress Note - Critical Care   Lobo Juarez 71 y o  female MRN: [id number]  Unit/Bed#: [facility] 201-01 Encounter: [id number]        ----------------------------------------------------------------------------------------  HPI/24hr events: yesterday started on hydralazine 25 mg q8h orally from IV, coreg 3 125 BID  Nitro gtt is currently at 80      ABG this AM 7 39/75/81/44    ---------------------------------------------------------------------------------------  SUBJECTIVE  Reports mild SOB, otherwise is comfortable  States she wants any further medical planning re:GoC to be a group discussion with family, including further plans re:oncology workup for renal mass  Review of Systems  Review of systems was reviewed and negative unless stated above in HPI/24-hour events   ---------------------------------------------------------------------------------------  Assessment and Plan:    #  Acute heart failure, LVEF 30% - tachycardia-mediated vs  aortic stenosis mediated  #  Atrial fibrillation/flutter  #  Hypertensive emergency  #  Primary respiratory acidosis w/compensatory metabolic alkalosis  #  Thrombocytopenia, acute - ? HIT  #  Moderate to Severe aortic stenosis, bicuspid  #  Pulmonary hypertension - group 2 vs 3  #  Suspected renal cell carcinoma (3 3 cm R pole mass)  #  Liver mass 1 cm  #  Large non-incarcerated ventral hernia ([id number] cm)  #  Abdominal aortic aneurysm  #  Elevated liver transaminases - suspect congestive hepatopathy from CHF  #  Hyperlipidemia  #  Former tobacco smoker - appx 87 pack years (1-2 ppd smoker)  #   Ambulatory dysfunction    Neuro:  · Diagnosis: No acute issues  · Plan: Delirium precautions  · CAM-ICU daily    CV:  · Diagnosis: Acute HFrEF 30%, severe global hypokinesis  · Admission proBNP 21k  · Confirmed on 11/12 [facility] admission echo  · S/p cardizem drip and started on digoxin load while at 's ED  · Troponins slightly elevated to 60s - likely 2/2 demand St x1 day ischemia  · Plan:  · 60 mg lasix IV BID  · Nitro GTT --> weaning off as able to goal normotension  · Continue 20mg isordil q8h  · BiPAP PRN for preload and afterload reduction/if has increased WOB  · Target net -500 to 1L for today  · Saunders in place for strict I/O -- consider switchng to purwick  · 1 8L cardiac/carb2/fluid restriction, 2g na restriction  · Further ischemic workup eg cardiac cath - will appreciate cardiology's assistance with timing  · Diagnosis: Atrial fibrillation/flutter  · Seen on admission to [facility] = flutter; now in afib rate 100s  · [id number] is at least 4  · TSH normal  · A1C 5 9, +hyperlipidemia  · Plan:  · AC: heparin gtt (in event of biopsy) - see heme/onc re: changing to other [facility] agent  · Rhythm: Continue digoxin load 125 mcg now qOD  · Rate: Started on 3 125 BID coreg  · Continuous cardiac monitoring  · Diagnosis: Severe stenosis of bicuspid aortic valve  · Seen on 11/12 echo  · Plan: BB as above  · Diagnosis: Hypertensive emergency  · Admission /98, went as high as 190s/100s  · Plan: Target /80  · Hydralazine 50 mg q8h, coreg 3 125 BID, nitro gtt to target BP <130    Pulm:  · Diagnosis: Combined hypoxic hypercarbic respiratory failure  · Plan: BiPAP 18/10, 50% FiO2 around the clock - wean to NCs tart at 6L - if fails check vbg  · Target O2 > 92%  · Respiratory protocol  · Diagnosis: 87 pack year smoker  · Quit 6 weeks PTA  · Plan: Outpatient spirometry/PFTs  · Needs flu, pneumonia vaccines prior to discharge    GI:  · Diagnosis: Ventral hernia  · 17 x 14 x 5 cm  · Monitor abdominal exams for signs of incarceration  · Plan: Implement bowel regimen when starting a po diet  · Diagnosis: GI ppx  · Plan: Not indicated  · Diagnosis: Elevated transaminases - likely heart failure related congestive hepatopathy  · Plan: Treat HF and monitor for improvement of transaminases, then if needed, can initiate further workup eg chronic and acute hepatitis panels    :  · Diagnosis: No acute issues  · Plan: I/O strict with evie in place for CHF  · Lasix 80 bid    F/E/N:  · F: None  · E: Goal Mg>2 0, Phos>3 0, K>4 0  BMP checks q12h while diuresing  · N: NPO, sips with meds while on BiPAP  When off biPAP, will change to 1 8L fluid restriction, 2g Na restriction, cardiac diet - trial off BIPAP today - if tolerates speech eval for diet    Heme/Onc:  · Diagnosis: DVT ppx  · Plan: Already on hep gtt for [facility] for afib  · Maintain on hep gtt in event of need for onc biopsy  · Diagnosis: Positive D-dimer - suspect in setting of CHF  · Negative CTA PE, negative duplex studies, negative COVID  · Plan: No further workup  · In absence of fevers/ leukocytosis, will not treat for cellulitis of lower extremities unless develops gely drainage/open wounds  • Diagnosis:  Acute thrombocytopenia  o Plan:  Consider holding heparin drip then switching to argatroban drip      Endo:  · Diagnosis: Prediabetes A1C 5 9  · Plan: Started on algorithm 1 SSI given low weight    ID:  · Diagnosis: No acute issues  · Flu/COVID/RSV negative  · Peripheral bcx x2 negative @48hrs  · UA on admission no bacteria, nitrites, or leukocytes    MSK/Skin:  · Diagnosis: Lower extremity erythema 2/2 CHF  · Plan: Wound care consult   Ordered PT OT for mobilization    Lines: Evie D3 - consider d/c    Patient appropriate for transfer out of the ICU today?: No  Disposition: Continue Stepdown Level 1 level of care   Code Status: Level 1 - Full Code  ---------------------------------------------------------------------------------------  ICU CORE MEASURES    Prophylaxis   VTE Pharmacologic Prophylaxis: Heparin Drip  VTE Mechanical Prophylaxis: sequential compression device  Stress Ulcer Prophylaxis: Prophylaxis Not Indicated     ABCDE Protocol (if indicated)  Plan to perform spontaneous awakening trial today? Not applicable  Plan to perform spontaneous breathing trial today? Not applicable  Obvious barriers to extubation?  Not applicable  CAM-ICU: Negative    Invasive Devices Review  Invasive Devices  Report    Peripheral Intravenous Line  Duration           Peripheral IV 22 Right Forearm 2 days    Peripheral IV 22 Left;Ventral (anterior) Forearm 1 day          Drain  Duration           Urethral Catheter 16 Fr  1 day              Can any invasive devices be discontinued today? Not applicable  ---------------------------------------------------------------------------------------  OBJECTIVE    Vitals   Vitals:    22 0000 22 0035 22 0240 22 0300   BP: (!) 179/72 164/61 (!) 177/73 (!) 177/61   Pulse: 76 68 66 68   Resp:       Temp:       TempSrc:       SpO2: 95% 97% 98% 97%   Weight:       Height:         Temp (24hrs), Av 2 °F (36 8 °C), Min:97 8 °F (36 6 °C), Max:98 7 °F (37 1 °C)  Current: Temperature: 97 8 °F (36 6 °C)  BP (!) 177/61   Pulse 68   Temp 97 8 °F (36 6 °C) (Axillary)   Resp 15   Ht 5' 6" (1 676 m)   Wt 49 9 kg (110 lb)   SpO2 97%   BMI 17 75 kg/m²       Respiratory:  SpO2: SpO2: 97 %  Nasal Cannula O2 Flow Rate (L/min): 3 L/min    Invasive/non-invasive ventilation settings   Respiratory  Report   Lab Data (Last 4 hours)    None         O2/Vent Data (Last 4 hours)    None                Physical Exam  Vitals reviewed  Constitutional:       General: She is not in acute distress  Appearance: She is ill-appearing  She is not diaphoretic  Comments: Appears cachectic/emaciated, significant buccal wasting   HENT:      Head: Normocephalic  Eyes:      General: No scleral icterus  Right eye: No discharge  Left eye: No discharge  Extraocular Movements: Extraocular movements intact  Cardiovascular:      Heart sounds: Murmur (4/6 holosystolic over 2nd R USB) heard  Pulmonary:      Effort: No respiratory distress  Breath sounds: No stridor  Rales (most prominent in BLL) present  No wheezing  Comments: Wearing nasal cannula  Abdominal:      Palpations: Abdomen is soft  Tenderness: There is no abdominal tenderness  There is no guarding  Musculoskeletal:      Right lower leg: Edema (1+) present  Left lower leg: Edema (1+) present  Skin:     General: Skin is warm and dry  Comments: RLE > LLE erythema  Now RLE w/2 clear blisters  Still has some linear excoriations from prior to admission  No pus/drainage present   Neurological:      Mental Status: She is alert        Comments: AO to self, time, but not place (thought she was in rehab)   Psychiatric:         Mood and Affect: Mood normal          Behavior: Behavior normal              Laboratory and Diagnostics:  Results from last 7 days   Lab Units 11/14/22  0559 11/13/22  1333 11/12/22  1127 11/11/22  2328 11/11/22  1056   WBC Thousand/uL 9 06 13 03* 9 72 7 07 7 24   HEMOGLOBIN g/dL 11 7 12 1 13 8 13 5 14 2   HEMATOCRIT % 38 7 37 9 43 5 43 1 44 6   PLATELETS Thousands/uL 89* 99* 118* 104* 115*   NEUTROS PCT %  --   --   --   --  87*   BANDS PCT %  --  14*  --   --   --    MONOS PCT %  --   --   --   --  8   MONO PCT %  --  3*  --   --   --      Results from last 7 days   Lab Units 11/13/22  1407 11/13/22  0904 11/13/22  0544 11/12/22  1304 11/11/22  1056   SODIUM mmol/L 136 140 138 139 138   POTASSIUM mmol/L 3 2* 3 5 3 6 3 5 4 9   CHLORIDE mmol/L 89* 91* 92* 96 98   CO2 mmol/L >45* >45* 44* 41* 36*   ANION GAP mmol/L  --   --  2* 2* 4   BUN mg/dL 22 22 24 30* 46*   CREATININE mg/dL 0 76 0 79 0 80 0 81 1 15   CALCIUM mg/dL 8 6 8 8 8 5 9 1 9 4   GLUCOSE RANDOM mg/dL 175* 137 150* 126 138   ALT U/L  --   --  156* 204* 289*   AST U/L  --   --  70* 87*  --    ALK PHOS U/L  --   --  67 82 84   ALBUMIN g/dL  --   --  2 3* 2 7* 2 8*   TOTAL BILIRUBIN mg/dL  --   --  1 48* 1 58* 1 72*     Results from last 7 days   Lab Units 11/13/22  0544 11/11/22  1056   MAGNESIUM mg/dL 1 4* 2 0   PHOSPHORUS mg/dL 2 8  --       Results from last 7 days   Lab Units 11/14/22  0607 11/13/22  0544 11/13/22  0015 11/12/22  1735 11/12/22  1127 11/12/22  0529 11/11/22  2328 11/11/22  1056   INR   --   --   --   --  1 11  --  1 26* 1 22*   PTT seconds 68* 68* 78* 86* 51* 24 25 26          Results from last 7 days   Lab Units 11/11/22  1304 11/11/22  1119   LACTIC ACID mmol/L 1 4 2 2*     ABG:  Results from last 7 days   Lab Units 11/13/22  0624 11/13/22  0601   PH ART  7 391 7 396   PCO2 ART mm Hg 74 8* 81 4*   PO2 ART mm Hg 81 2 65 5*   HCO3 ART mmol/L 44 4* 48 8*   BASE EXC ART mmol/L 15 9 19 7   ABG SOURCE   --  Radial, Left     VBG:  Results from last 7 days   Lab Units 11/13/22  0601   ABG SOURCE  Radial, Left     Results from last 7 days   Lab Units 11/11/22  1056   PROCALCITONIN ng/ml 0 19       Micro  Results from last 7 days   Lab Units 11/11/22  1119   BLOOD CULTURE  No Growth at 48 hrs  No Growth at 48 hrs  EKG: Afib vs  NSR with significant artifact (appears regular but no discernable p waves)  Imaging: no new I have personally reviewed pertinent reports  Intake and Output  I/O       11/12 0701 11/13 0700 11/13 0701 11/14 0700    P  O  240 380    I V  (mL/kg) 475 9 (9 5) 832 3 (16 7)    IV Piggyback  80    Total Intake(mL/kg) 715 9 (14 3) 1292 3 (25 9)    Urine (mL/kg/hr) 4080 2375 (2)    Total Output 4080 2375    Net -3364 1 -1082 7          Unmeasured Urine Occurrence 1 x         UOP: 100 ml/hr     Height and Weights   Height: 5' 6" (167 6 cm)     Body mass index is 17 75 kg/m²  Weight (last 2 days)     Date/Time Weight    11/13/22 0548 49 9 (110)    11/12/22 1100 68 (150)            Nutrition       Diet Orders   (From admission, onward)             Start     Ordered    11/13/22 1340  Diet Cardiovascular; Cardiac  Diet effective midnight        References:    Nutrtion Support Algorithm Enteral vs  Parenteral   Question Answer Comment   Diet Type Cardiovascular    Cardiac Cardiac    RD to adjust diet per protocol?  Yes        11/13/22 1340              No tube feeds      Active Medications  Scheduled Meds:  Current Facility-Administered Medications   Medication Dose Route Frequency Provider Last Rate   • albuterol  2 5 mg Nebulization Q4H PRN Sebastian Delaney MD     • carvedilol  3 125 mg Oral BID With Meals Wale Gonzales MD     • [START ON 11/15/2022] digoxin  125 mcg Intravenous Every Other Day Wale Gonzales MD     • furosemide  60 mg Intravenous BID (diuretic) Wale Gonzales MD     • heparin (porcine)  3-20 Units/kg/hr (Order-Specific) Intravenous Titrated Dimitri Mendenhall MD 18 Units/kg/hr (11/14/22 0400)   • heparin (porcine)  1,950 Units Intravenous Q1H PRN Dimitri Mendenhall MD     • heparin (porcine)  3,900 Units Intravenous Q1H PRN Dimitri Mendenhall MD     • hydrALAZINE  50 mg Oral Q8H [address] Wale Gonzales MD     • isosorbide dinitrate  20 mg Oral Q8H Wale Gonzales MD     • nitroGLYcerin  5-200 mcg/min Intravenous Titrated Zain Davidson MD 80 mcg/min (11/14/22 0044)     Continuous Infusions:  heparin (porcine), 3-20 Units/kg/hr (Order-Specific), Last Rate: 18 Units/kg/hr (11/14/22 0400)  nitroGLYcerin, 5-200 mcg/min, Last Rate: 80 mcg/min (11/14/22 0044)      PRN Meds:   albuterol, 2 5 mg, Q4H PRN  heparin (porcine), 1,950 Units, Q1H PRN  heparin (porcine), 3,900 Units, Q1H PRN        Allergies   No Known Allergies  ---------------------------------------------------------------------------------------  Advance Directive and Living Will:      Power of :    POLST:    ---------------------------------------------------------------------------------------  Care Time Delivered:   20 minutes    Zain Davidson MD      Portions of the record may have been created with voice recognition software  Occasional wrong word or "sound a like" substitutions may have occurred due to the inherent limitations of voice recognition software    Read the chart carefully and recognize, using context, where substitutions have occurred

## 2022-11-14 NOTE — PLAN OF CARE
Problem: PHYSICAL THERAPY ADULT  Goal: Performs mobility at highest level of function for planned discharge setting  See evaluation for individualized goals  Description: Treatment/Interventions: Functional transfer training, LE strengthening/ROM, Therapeutic exercise, Endurance training, Patient/family training, Cognitive reorientation, Equipment eval/education, Bed mobility, Gait training, Spoke to nursing          See flowsheet documentation for full assessment, interventions and recommendations  Note: Prognosis: Fair  Problem List: Decreased strength, Decreased range of motion, Decreased endurance, Impaired balance, Decreased mobility, Decreased cognition, Decreased safety awareness, Impaired judgement  Assessment: Pt seen for high complexity PT evaluation due to decrease in functional mobility status compared to baseline  Pt with active PT eval/treat orders at this time  Pt is a 71 y o  F who presented to [facility] with acute respiratory failure with hypoxia on 11/12/22  Pt  has no past medical history on file  Pt resides with foster children in [facility] home with 13STE  Pt presents with decreased strength, balance, endurance that contribute to limitations in bed mobility, functional transfers, functional mobility  Pt requires Min A for all mobility at this time  Pt left upright in bedside chair with chair alarm intact and with all needs in reach  Pt will benefit from skilled therapy in order to address current impairments and functional limitations  PT to follow pt and recommending rehab once medically cleared  The patient's AM-PAC Basic Mobility Inpatient Short Form Raw Score is 14  A Raw score of less than or equal to 16 suggests the patient may benefit from discharge to post-acute rehabilitation services  Please also refer to the recommendation of the Physical Therapist for safe discharge planning    Barriers to Discharge: Inaccessible home environment, Decreased caregiver support     PT Discharge Recommendation: Post acute rehabilitation services    See flowsheet documentation for full assessment

## 2022-11-14 NOTE — QUICK NOTE
Attempt to wean IV medication and nitro gtt:   -start hydralazine 25 mg oral q8h now  -start coreg 3 125 mg   -can stop IV hydralazine   -will follow bp closely and plenty of room with iso/hydralaazine/coreg for titration to safely wean nitro gtt especially considering AS      Discussed plan with Dr El Rdz and resident Alycia Scott

## 2022-11-14 NOTE — PROGRESS NOTES
Per Lu Aj Do not increase nitro Gtt  Aware of pressure 180  Contact CC medicine if pressure is above 200  No increase in PRNs will be given to control blood pressure at this time  Implemented All Fall with Harm Risk Interventions:  [place] to call system. Call bell, personal items and telephone within reach. Instruct patient to call for assistance. Room bathroom lighting operational. Non-slip footwear when patient is off stretcher. Physically safe environment: no spills, clutter or unnecessary equipment. Stretcher in lowest position, wheels locked, appropriate side rails in place. Provide visual cue, wrist band, yellow gown, etc. Monitor gait and stability. Monitor for mental status changes and reorient to person, place, and time. Review medications for side effects contributing to fall risk. Reinforce activity limits and safety measures with patient and family. Provide visual clues: red socks.

## 2022-11-14 NOTE — WOUND OSTOMY CARE
Consult Note - Wound   Rita Tatum 71 y o  female MRN: [id number]  Unit/Bed#: [facility] 201-01 Encounter: [id number]        History and Present Illness:  Patient is 70 yo female admitted to [facility] with acute respiratory failure  Patient has history of pulmonary hypertension, renal and liver mass, a-fib, and hernia  Patient has indwelling case catheter and is continent of bowel  Patient is min assist with turning from side to side  Assessment Findings:     1  POA stage II sacrum-  Small, partial thickness skin loss with pink tissue, no drainage  2  Blister right dorsal foot- intact, fluid filled blister r/t edema  Protected with dry dressing  No induration, fluctuance, odor, warmth/temperature differences, redness, or purulence noted to the above noted wounds and skin areas assessed  New dressings applied per orders listed below  Patient tolerated well- no s/s of non-verbal pain or discomfort observed during the encounter  Bedside nurse aware of plan of care  See flow sheets for more detailed assessment findings  Wound care will continue to follow  Skin care Plan:  1-Cleanse sacro-buttocks with soap and water  Apply Allevyn foam  Jarad with T for treatment  Change every three days and PRN  2-Turn/reposition q2h or when medically stable for pressure re-distribution on skin   3-Elevate heels to offload pressure  4-Moisturize skin daily with skin nourishing cream  5-Ehob cushion in chair when out of bed  6-Hydraguard to bilateral heels BID and PRN  7-Apply dry dressing to right dorsal foot daily  Wounds:  Wound 11/12/22 Pressure Injury Sacrum (Active)   Wound Image   11/14/22 1208   Wound Description Pink 11/14/22 1208   Pressure Injury Stage 2 11/14/22 1208   Corina-wound Assessment Pink; Hyperpigmented 11/14/22 1208   Wound Length (cm) 0 3 cm 11/14/22 1208   Wound Width (cm) 0 3 cm 11/14/22 1208   Wound Depth (cm) 0 1 cm 11/14/22 1208   Wound Surface Area (cm^2) 0 09 cm^2 11/14/22 1208   Wound Volume (cm^3) 0 009 cm^3 11/14/22 1208   Calculated Wound Volume (cm^3) 0 01 cm^3 11/14/22 1208   Tunneling 0 cm 11/14/22 1208   Tunneling in depth located at 0 11/14/22 1208   Undermining 0 11/14/22 1208   Undermining is depth extending from 0 11/14/22 1208   Wound Site Closure FRANCISCA 11/14/22 1208   Drainage Amount None 11/14/22 1208   Non-staged Wound Description Partial thickness 11/14/22 1208   Treatments Cleansed 11/14/22 1208   Dressing Foam, Silicon (eg  Allevyn, etc) 11/14/22 1208   Wound packed? No 11/14/22 1208   Packing- # removed 0 11/14/22 1208   Packing- # inserted 0 11/14/22 3923   Dressing Changed New 11/14/22 1208   Patient Tolerance Tolerated well 11/14/22 1208   Dressing Status Clean;Dry; Intact 11/14/22 1208       Wound 11/14/22 Venous Ulcer Foot Anterior;Right (Active)   Wound Description Epithelialization;Fragile 11/14/22 1525   Corina-wound Assessment Edema 11/14/22 1525   Wound Length (cm) 1 cm 11/14/22 1525   Wound Width (cm) 1 2 cm 11/14/22 1525   Wound Depth (cm) 0 cm 11/14/22 1525   Wound Surface Area (cm^2) 1 2 cm^2 11/14/22 1525   Wound Volume (cm^3) 0 cm^3 11/14/22 1525   Calculated Wound Volume (cm^3) 0 cm^3 11/14/22 1525   Tunneling 0 cm 11/14/22 1525   Tunneling in depth located at 0 11/14/22 1525   Undermining 0 11/14/22 1525   Undermining is depth extending from 0 11/14/22 1525   Wound Site Closure FRANCISCA 11/14/22 1525   Drainage Amount None 11/14/22 1525   Non-staged Wound Description Not applicable 87/69/18 6020   Treatments Cleansed 11/14/22 1525   Dressing Dry dressing 11/14/22 1525   Wound packed? No 11/14/22 1525   Packing- # removed 0 11/14/22 1525   Packing- # inserted 0 11/14/22 1525   Dressing Changed New 11/14/22 1525   Patient Tolerance Tolerated well 11/14/22 1525   Dressing Status Clean;Dry; Intact 11/14/22 1525       Sonia HILLN, RN, [facility] [employer]

## 2022-11-15 NOTE — PHYSICAL THERAPY NOTE
PT orders received chart reviewed  Pt transitioned to level 4 comfort care   Will D/C from PT at this  time   Katerine Andrea, PT, DPT     11/15/22 1600   PT Last Visit   PT Visit Date 11/15/22   Note Type   Note type Screen

## 2022-11-15 NOTE — CASE MANAGEMENT
Case Management Discharge Planning Note    Patient name Manual Hatchet  [facility] [id number]  : 1950 Date 11/15/2022       Current Admission Date: 2022  Current Admission Diagnosis:Acute respiratory failure with hypoxia [facility])   Patient Active Problem List    Diagnosis Date Noted   • Acute respiratory failure with hypoxia ([facility] [address] ) 2022   • Atrial flutter with rapid ventricular response ([facility] [address] ) 2022   • Renal mass 2022   • Volume overload 2022   • Hernia of abdominal wall 2022   • Aneurysm of infrarenal abdominal aorta 2022      LOS (days): 3  Geometric Mean LOS (GMLOS) (days): 3 90  Days to GMLOS:0 8     OBJECTIVE:  Risk of Unplanned Readmission Score: 10 52         Current admission status: Inpatient   Preferred Pharmacy:   [facility] [address] [place] [address]  Phone: [phone] Fax: [phone]    Primary Care Provider: No primary care provider on file  Primary Insurance: MEDICARE  Secondary Insurance:     DISCHARGE DETAILS:               Discharge Destination Plan[de-identified] Other (comfort care)            Additional Comments: Met with daughter at bedside, as per chart patient will be transitioning to comfort care  CM offered support  Daughter asked for information about living will/POA, provided info packet to her  CM will continue to follow

## 2022-11-15 NOTE — OCCUPATIONAL THERAPY NOTE
OT CANCEL NOTE    Pt chart reviewed  Pt is on bipap; per RN not medically stable at this time (also pending possible transition to comfort care?)  Will hold OT treatment  Will continue to follow pt on caseload and see pt when medically stable and as clinically appropriate         11/15/22 1352   OT Last Visit   OT Visit Date 11/15/22   Note Type   Note Type Cancelled Session   Cancel Reasons Medical status       Wendy Torres MS, OTR/L

## 2022-11-15 NOTE — RESPIRATORY THERAPY NOTE
RT Protocol Note  Maik Plata 71 y o  female MRN: [id number]  Unit/Bed#: [facility] 201-01 Encounter: [id number]    Assessment    Principal Problem:    Acute respiratory failure with hypoxia [facility]  Active Problems:    Atrial flutter with rapid ventricular response (HCC)    Renal mass    Volume overload    Hernia of abdominal wall    Aneurysm of infrarenal abdominal aorta      Home Pulmonary Medications:  na       No past medical history on file  Social History     Socioeconomic History    Marital status: Single     Spouse name: Not on file    Number of children: Not on file    Years of education: Not on file    Highest education level: Not on file   Occupational History    Not on file   Tobacco Use    Smoking status: Current Every Day Smoker    Smokeless tobacco: Never Used   Substance and Sexual Activity    Alcohol use: Not on file    Drug use: Never    Sexual activity: Not on file   Other Topics Concern    Not on file   Social History Narrative    Not on file     Social Determinants of Health     Financial Resource Strain: Not on file   Food Insecurity: No Food Insecurity    Worried About Running Out of Food in the Last Year: Never true    [address] in the Last Year: Never true   Transportation Needs: No Transportation Needs    Lack of Transportation (Medical): No    Lack of Transportation (Non-Medical):  No   Physical Activity: Not on file   Stress: Not on file   Social Connections: Not on file   Intimate Partner Violence: Not on file   Housing Stability: Unknown    Unable to Pay for Housing in the Last Year: No    Number of Places Lived in the Last Year: Not on file    Unstable Housing in the Last Year: No       Subjective    Subjective Data: Pt cont on bipap    Objective    Physical Exam:   Assessment Type: (P) Assess only  General Appearance: (P) Awake  Respiratory Pattern: (P) Tachypneic  Chest Assessment: (P) Chest expansion symmetrical  Bilateral Breath Sounds: (P) Diminished    Vitals:  Blood pressure (!) 193/67, pulse 68, temperature 97 9 °F (36 6 °C), temperature source Axillary, resp  rate 15, height 5' 6" (1 676 m), weight 50 8 kg (112 lb 1 6 oz), SpO2 (!) 89 %  Results from last 7 days   Lab Units 11/15/22  0331   PH ART  7 361   PCO2 ART mm Hg 122 6*   PO2 ART mm Hg 83 1   HCO3 ART mmol/L 67 9*   BASE EXC ART mmol/L 34 4   O2 CONTENT ART mL/dL 17 2   O2 HGB, ARTERIAL % 94 5   ABG SOURCE  Radial, Left   RITU TEST  Yes       Imaging and other studies: I have personally reviewed pertinent reports        O2 Device: bipap     Plan    Respiratory Plan: Vent/NIV/HFNC        Resp Comments: (P) physician changed settings, pt arouses easily, will continue to monitor, no resp meds at home, breath sounds diminished, no resp hx

## 2022-11-15 NOTE — OCCUPATIONAL THERAPY NOTE
Occupational Therapy         Patient Name: Genaro Perez  OXNXB'D Date: 11/15/2022           11/15/22 1600   OT Last Visit   OT Visit Date 11/15/22   Note Type   Cancel Reasons Other  (Note pt transitioned to comfort care - will d/c from caseload)     Milderd Brayden

## 2022-11-15 NOTE — CONSULTS
Consultation - Palliative and Supportive Care   Pradeep Roman 71 y o  female [id number]    Patient Active Problem List   Diagnosis   • Acute respiratory failure with hypoxia [facility])   • Atrial flutter with rapid ventricular response (HCC)   • Renal mass   • Volume overload   • Hernia of abdominal wall   • Aneurysm of infrarenal abdominal aorta     Active issues specifically addressed today include:   Acute hypoxic respiratory failure  Acute decompensated heart failure   Concern for underlying malignancy   Severe protein calorie malnutrition   Palliative care patient  Goals of care    Plan:  1  Symptom management - will defer to primary at this time  Will assist with medical management wants decision made to transition to comfort  2  Goals - evolving but trending towards hospice/comfort care  Patient is well supported by her 3 biological children who are all arriving to bedside  Plan for palliative bilateral thoracentesis with hope of liberating her from BiPAP  Confirmed level 3, DNR DNI status  3  Psychosocial support - support provided  Will ask social work team to follow up as well  4  PSC follow-up- will follow closely       Code Status:  DNR DNI - Level 3   Decisional apparatus:  Patient is not competent on my exam today  If competence is lost, patient's substitute decision maker would default to children by [place] 169  Advance Directive / Living Will / POLST:  None on file     I have reviewed the patient's controlled substance dispensing history in the Prescription Drug Monitoring Program in compliance with the [employer] regulations before prescribing any controlled substances  We appreciate the invitation to be involved in this patient's care  We will follow  Please do not hesitate to reach our on call provider through our clinic answering service at  should you have acute symptom control concerns      Napoleon Gaucher, DO  Palliative and Supportive Care  Clinic/Answering Service: [phone]  You can find me on [employer]! IDENTIFICATION:  Inpatient consult to Palliative Care  Consult performed by: Jaylen Lockhart DO  Consult ordered by: Rico Denton DO        Physician Requesting Consult: Deborah Mccollum MD  Reason for Consult / Principal Problem:  Goals of care  Hx and PE limited by:  Patient on BiPAP    HISTORY OF PRESENT ILLNESS:       Cinthya Yanez is a 71 y o  female who presented on November 11 to [address] with worsening fatigue  Reports patient has not seen a physician in over 40 years and thus has no medical history listed  She is brought in by her family for concerns of worsening weakness and sleeping for longer periods of time  Patient was with for foster children ages 15 to 32 that are mentally disabled  Per reports patient is typically capable of all ADLs and IADLs  Patient found to be in decompensated heart failure and was transferred to [facility] for further management  During her workup the patient was also found to have a renal mass suspicious for neoplasm and liver lesion concerning for metastatses  She was also noted to be significantly hypertensive with a systolic blood pressure in the 190s and in a flutter  Patient has had an ongoing decline is currently on BiPA P  Critical Care to discuss with family and decision was made DNR DNI  Palliative Care consulted for support in setting of critical illness  Patient's biological daughters at bedside she says her siblings are in route  She notes that there are 4 foster children at home 1 of which patient has legal custody of  Daughter states that 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 over the years  Daughter has been updated on clinical status and she is understanding that patient is very ill and will likely die in the coming hours to days      Review of Systems   Unable to perform ROS: severe respiratory distress       No past medical history on file  Past Surgical History:   Procedure Laterality Date   • APPENDECTOMY     • TONSILLECTOMY     • WRIST SURGERY       Social History     Socioeconomic History   • Marital status: Single     Spouse name: Not on file   • Number of children: Not on file   • Years of education: Not on file   • Highest education level: Not on file   Occupational History   • Not on file   Tobacco Use   • Smoking status: Current Every Day Smoker   • Smokeless tobacco: Never Used   Substance and Sexual Activity   • Alcohol use: Not on file   • Drug use: Never   • Sexual activity: Not on file   Other Topics Concern   • Not on file   Social History Narrative   • Not on file     Social Determinants of Health     Financial Resource Strain: Not on file   Food Insecurity: No Food Insecurity   • Worried About Running Out of Food in the Last Year: Never true   • Ran Out of Food in the Last Year: Never true   Transportation Needs: No Transportation Needs   • Lack of Transportation (Medical): No   • Lack of Transportation (Non-Medical): No   Physical Activity: Not on file   Stress: Not on file   Social Connections: Not on file   Intimate Partner Violence: Not on file   Housing Stability: Unknown   • Unable to Pay for Housing in the Last Year: No   • Number of Places Lived in the Last Year: Not on file   • Unstable Housing in the Last Year: No     No family history on file      MEDICATIONS / ALLERGIES:    all current active meds have been reviewed and current meds:   Current Facility-Administered Medications   Medication Dose Route Frequency   • captopril (CAPOTEN) tablet 12 5 mg  12 5 mg Oral TID   • carvedilol (COREG) tablet 3 125 mg  3 125 mg Oral BID With Meals   • digoxin (LANOXIN) injection 125 mcg  125 mcg Intravenous Every Other Day   • furosemide (LASIX) injection 80 mg  80 mg Intravenous BID (diuretic)   • heparin (porcine) 25,000 units in 0 45% NaCl 250 mL infusion (premix) 3-20 Units/kg/hr (Order-Specific) Intravenous Titrated   • heparin (porcine) injection 1,950 Units  1,950 Units Intravenous Q1H PRN   • heparin (porcine) injection 3,900 Units  3,900 Units Intravenous Q1H PRN   • hydrALAZINE (APRESOLINE) tablet 100 mg  100 mg Oral Q8H [address]   • insulin lispro (HumaLOG) 100 units/mL subcutaneous injection 1-5 Units  1-5 Units Subcutaneous TID AC   • isosorbide dinitrate (ISORDIL) tablet 30 mg  30 mg Oral Q8H   • magnesium sulfate 2 g/50 mL IVPB (premix) 2 g  2 g Intravenous Once   • nitroGLYcerin (TRIDIL) 50 mg in 250 mL infusion (premix)  5-200 mcg/min Intravenous Titrated   • potassium chloride 20 mEq IVPB (premix)  20 mEq Intravenous Q2H       No Known Allergies    OBJECTIVE:    Physical Exam  Physical Exam  Vitals and nursing note reviewed  Constitutional:       General: She is not in acute distress  Appearance: She is ill-appearing  Comments: Frail and cachetic    HENT:      Head: Normocephalic and atraumatic  Right Ear: External ear normal       Left Ear: External ear normal    Eyes:      General:         Right eye: No discharge  Left eye: No discharge  Cardiovascular:      Rate and Rhythm: Normal rate  Pulmonary:      Effort: Respiratory distress present  Abdominal:      General: There is no distension  Palpations: Abdomen is soft  Skin:     Coloration: Skin is pale  Neurological:      Comments: Awakens, but lethargic          Lab Results:   I have personally reviewed pertinent labs  , CBC:   Lab Results   Component Value Date    WBC 7 89 11/15/2022    HGB 11 8 11/15/2022    HCT 38 6 11/15/2022    MCV 98 11/15/2022    PLT 83 (L) 11/15/2022    MCH 29 9 11/15/2022    MCHC 30 6 (L) 11/15/2022    RDW 15 3 (H) 11/15/2022    MPV 10 8 11/15/2022   , CMP:   Lab Results   Component Value Date    SODIUM 135 11/15/2022    K 3 2 (L) 11/15/2022    CL 83 (L) 11/15/2022    CO2 >45 (HH) 11/15/2022    BUN 18 11/15/2022    CREATININE 0 59 (L) 11/15/2022 CALCIUM 9 0 11/15/2022    EGFR 92 11/15/2022   , PT/PTT:  Lab Results   Component Value Date    PTT 57 (H) 11/15/2022     Imaging Studies:  Reviewed  EKG, Pathology, and Other Studies:  Reviewed    Counseling / Coordination of Care    Total floor / unit time spent today 55+ minutes  Greater than 50% of total time was spent with the patient and / or family counseling and / or coordination of care  A description of the counseling / coordination of care:  Chart review, medication review, goals of care, supportive listening       Portions of this document may have been created using dictation software and as such some "sound alike" terms may have been generated by the system  Do not hesitate to contact me with any questions or clarifications

## 2022-11-15 NOTE — PROGRESS NOTES
Daily Progress Note - Critical Care   Madison Arreguin 71 y o  female MRN: [id number]  Unit/Bed#: [facility] 201-01 Encounter: [id number]        ----------------------------------------------------------------------------------------  HPI/24hr events: Madison Arreguin is a 71 y o  female who presents with progressive fatigue x 6 weeks  Patient has not seen a physician in 40 years  Unvaccinated - no flu/tdap/pneumococcal vaccines  Denies recent sick contacts  Brought in by family out of concern for progressive weakness and sleeping for longer periods of time  Patient lives with 4 foster children ages 15-34 but they are all mentally disabled and are unable to help take care of her  They noted that the patient fell asleep in her car at one point when she was sitting in her car parked  Patient is typically fully capable of all ADLs and IADLs  She has also had 2 falls at home but denies LOC or headstrike  She denies lightheadedness or dizziness  Social: 87 pack year smoker, quit 6 weeks ago but did not say why or tell family why  Never rec drug user, including IDVU  Does not drink alcohol  Has three children  All present at bedside during interview  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 w/mental disabilities over the past several decades  Patient was brought from home to [facility] ED AM of 11/11 and EKG showed aflutter  Trops negative, proBNP 21k  Bedside echo showed severely impaired EF  BLE swelling present, reported as 4+, so duplex were ordered for BLE and negative for DVT  D-dimer (+), CTA PE ordered  Negative for PE  However, it showed radiographic findings of pulm HTN, bilateral pulmonary effusions, and 3 cm renal mass c/f renal cell carcinoma  Upon presentation to [facility] patient arrived on nitro drip, heparin drip, and was continued on biPAP  VS were HR 100s afib, /64, satting 92% on 18/10 at 50%       11/15  Pt  systolic overnight at 50 NTG gtt - increased NTG gtt to titrate BP  ABG at 0350 co2 122 BIPAP settings changed from 18/10 to 22/8  Tidal volume in 400s  Draw repeat ABG at 0550    ---------------------------------------------------------------------------------------  SUBJECTIVE  Patient seen examined at bedside  Denies any complaints at this time  Review of Systems  Review of systems was reviewed and negative unless stated above in HPI/24-hour events   ---------------------------------------------------------------------------------------  Assessment and Plan:    #  Acute heart failure, LVEF 30% - tachycardia-mediated vs  aortic stenosis mediated  #  Atrial fibrillation/flutter  #  Hypertensive emergency  #  Primary respiratory acidosis w/compensatory metabolic alkalosis  #  Thrombocytopenia, acute - ? HIT  #  Moderate to Severe aortic stenosis, bicuspid  #  Pulmonary hypertension - group 2 vs 3  #  Suspected renal cell carcinoma (3 3 cm R pole mass)  #  Liver mass 1 cm  #  Large non-incarcerated ventral hernia ([id number] cm)  #  Abdominal aortic aneurysm  #  Elevated liver transaminases - suspect congestive hepatopathy from CHF  #  Hyperlipidemia  #  Former tobacco smoker - appx 87 pack years (1-2 ppd smoker)  #   Ambulatory dysfunction    Neuro:  · Diagnosis: No acute issues  · Plan: Delirium precautions  · CAM-ICU daily    CV:  · Diagnosis: Acute HFrEF 30%, severe global hypokinesis  · Admission proBNP 21k  · Confirmed on 11/12 [facility] admission echo  · S/p cardizem drip and started on digoxin load while at [facility] ED  · Troponins slightly elevated to 60s - likely 2/2 demand ischemia  · Plan:  · 60 mg lasix IV BID  · Nitro GTT --> weaning off as able to goal normotension  · Continue 20mg isordil q8h  · BiPAP PRN for preload and afterload reduction/if has increased WOB  · Target net -500 to 1L for today  · Saunders in place for strict I/O -- consider switchng to purwick  · 1 8L cardiac/carb2/fluid restriction, 2g na restriction  · Further ischemic workup eg cardiac cath - will appreciate cardiology's assistance with timing  · Diagnosis: Atrial fibrillation/flutter  · Seen on admission to [facility] = flutter; now in afib rate 100s  · [id number] is at least 4  · TSH normal  · A1C 5 9, +hyperlipidemia  · Plan:  · AC: heparin gtt (in event of biopsy) - see heme/onc re: changing to other [facility] [place] operated by [facility] agent  · Rhythm: Continue digoxin load 125 mcg now qOD  · Rate: Started on 3 125 BID coreg  · Continuous cardiac monitoring  · Diagnosis: Severe stenosis of bicuspid aortic valve  · Seen on 11/12 echo  · Plan: BB as above  · Diagnosis: Hypertensive emergency  · Admission /98, went as high as 190s/100s  · Plan: Target /80  · Hydralazine 50 mg q8h, coreg 3 125 BID, nitro gtt to target BP <130    Pulm:  · Diagnosis: Combined hypoxic hypercarbic respiratory failure  · Plan: BiPAP 18/10, 50% FiO2 around the clock - wean to NCs tart at 6L - if fails check vbg  · Target O2 > 92%  · Respiratory protocol  · Diagnosis: 87 pack year smoker  · Quit 6 weeks PTA  · Plan: Outpatient spirometry/PFTs  · Needs flu, pneumonia vaccines prior to discharge    GI:  · Diagnosis: Ventral hernia  · 17 x 14 x 5 cm  · Monitor abdominal exams for signs of incarceration  · Plan: Implement bowel regimen when starting a po diet  · Diagnosis: GI ppx  · Plan: Not indicated  · Diagnosis: Elevated transaminases - likely heart failure related congestive hepatopathy  · Plan: Treat HF and monitor for improvement of transaminases, then if needed, can initiate further workup eg chronic and acute hepatitis panels    :  · Diagnosis: No acute issues  · Plan: I/O strict with case in place for CHF  · Lasix 80 bid    F/E/N:  · F: None  · E: Goal Mg>2 0, Phos>3 0, K>4 0  BMP checks q12h while diuresing  · N: NPO, sips with meds while on BiPAP   When off biPAP, will change to 1 8L fluid restriction, 2g Na restriction, cardiac diet - trial off BIPAP today - if tolerates speech eval for diet    Heme/Onc:  · Diagnosis: DVT ppx  · Plan: Already on hep gtt for [facility] for afib  · Maintain on hep gtt in event of need for onc biopsy  · Diagnosis: Positive D-dimer - suspect in setting of CHF  · Negative CTA PE, negative duplex studies, negative COVID  · Plan: No further workup  · In absence of fevers/ leukocytosis, will not treat for cellulitis of lower extremities unless develops gely drainage/open wounds  • Diagnosis:  Acute thrombocytopenia  o Plan:  Consider holding heparin drip then switching to argatroban drip      Endo:  · Diagnosis: Prediabetes A1C 5 9  · Plan: Started on algorithm 1 SSI given low weight    ID:  · Diagnosis: No acute issues  · Flu/COVID/RSV negative  · Peripheral bcx x2 negative @48hrs  · UA on admission no bacteria, nitrites, or leukocytes    MSK/Skin:  · Diagnosis: Lower extremity erythema 2/2 CHF  · Plan: Wound care consult   Ordered PT OT for mobilization    Patient appropriate for transfer out of the ICU today?: No  Disposition: Continue Critical Care   Code Status: Level 1 - Full Code  ---------------------------------------------------------------------------------------  ICU CORE MEASURES    Prophylaxis   VTE Pharmacologic Prophylaxis: Heparin Drip  VTE Mechanical Prophylaxis: sequential compression device  Stress Ulcer Prophylaxis: Prophylaxis Not Indicated       Invasive Devices Review  Invasive Devices  Report    Peripheral Intravenous Line  Duration           Peripheral IV 11/11/22 Right Forearm 3 days    Peripheral IV 11/12/22 Left;Ventral (anterior) Forearm 2 days          Drain  Duration           Urethral Catheter 16 Fr  2 days              Can any invasive devices be discontinued today?  No  ---------------------------------------------------------------------------------------  OBJECTIVE    Vitals   Vitals:    11/14/22 2042 11/14/22 2137 11/14/22 2300 11/14/22 2329   BP:  (!) 178/73     Pulse:       Resp:       Temp:   97 8 °F (36 6 °C)    TempSrc:   Oral    SpO2: 94%   94%   Weight:       Height:         Temp (24hrs), Av 7 °F (36 5 °C), Min:97 5 °F (36 4 °C), Max:97 9 °F (36 6 °C)  Current: Temperature: 97 8 °F (36 6 °C)  64  175/68    Respiratory:  SpO2: SpO2: 96 %, SpO2 Activity: SpO2 Activity: At Rest, SpO2 Device: O2 Device: Nasal cannula  Nasal Cannula O2 Flow Rate (L/min): 4 L/min    Invasive/non-invasive ventilation settings   Respiratory  Report   Lab Data (Last 4 hours)    None         O2/Vent Data (Last 4 hours)      2329          Non-Invasive Ventilation Mode BiPAP                   Physical Exam  Vitals and nursing note reviewed  Constitutional:       Appearance: She is cachectic  HENT:      Head: Normocephalic and atraumatic  Mouth/Throat:      Mouth: Mucous membranes are moist    Eyes:      Extraocular Movements: Extraocular movements intact  Cardiovascular:      Rate and Rhythm: Normal rate and regular rhythm  Heart sounds: Murmur heard  Systolic murmur is present  Pulmonary:      Effort: No respiratory distress  Breath sounds: Rales present  Abdominal:      Palpations: Abdomen is soft  Tenderness: There is no abdominal tenderness  Hernia: A hernia is present  Hernia is present in the ventral area  Musculoskeletal:         General: Normal range of motion  Cervical back: Normal range of motion  Right lower leg: Pitting Edema present  Left lower leg: Pitting Edema present  Skin:     General: Skin is warm and dry  Neurological:      General: No focal deficit present  Mental Status: She is alert and oriented to person, place, and time               Laboratory and Diagnostics:  Results from last 7 days   Lab Units 22  0559 22  1333 22  1127 22  2328 22  1056   WBC Thousand/uL 9 06 13 03* 9 72 7 07 7 24   HEMOGLOBIN g/dL 11 7 12 1 13 8 13 5 14 2   HEMATOCRIT % 38 7 37 9 43 5 43 1 44 6   PLATELETS Thousands/uL 89* 99* 118* 104* 115*   NEUTROS PCT %  --   --   --   -- 87*   BANDS PCT %  --  14*  --   --   --    MONOS PCT %  --   --   --   --  8   MONO PCT %  --  3*  --   --   --      Results from last 7 days   Lab Units 11/14/22  1635 11/14/22  0535 11/13/22  1407 11/13/22  0904 11/13/22  0544 11/12/22  1304 11/11/22  1056   SODIUM mmol/L 133* 138 136 140 138 139 138   POTASSIUM mmol/L 3 5 3 9 3 2* 3 5 3 6 3 5 4 9   CHLORIDE mmol/L 84* 88* 89* 91* 92* 96 98   CO2 mmol/L >45* >45* >45* >45* 44* 41* 36*   ANION GAP mmol/L  --   --   --   --  2* 2* 4   BUN mg/dL 20 19 22 22 24 30* 46*   CREATININE mg/dL 0 70 0 69 0 76 0 79 0 80 0 81 1 15   CALCIUM mg/dL 9 2 8 7 8 6 8 8 8 5 9 1 9 4   GLUCOSE RANDOM mg/dL 169* 115 175* 137 150* 126 138   ALT U/L  --  125*  --   --  156* 204* 289*   AST U/L  --  53*  --   --  70* 87*  --    ALK PHOS U/L  --  57  --   --  67 82 84   ALBUMIN g/dL  --  2 1*  --   --  2 3* 2 7* 2 8*   TOTAL BILIRUBIN mg/dL  --  1 30*  --   --  1 48* 1 58* 1 72*     Results from last 7 days   Lab Units 11/14/22  0535 11/13/22  0544 11/11/22  1056   MAGNESIUM mg/dL 1 7 1 4* 2 0   PHOSPHORUS mg/dL 2 7 2 8  --       Results from last 7 days   Lab Units 11/14/22  0607 11/13/22  0544 11/13/22  0015 11/12/22  1735 11/12/22  1127 11/12/22  0529 11/11/22  2328 11/11/22  1056   INR   --   --   --   --  1 11  --  1 26* 1 22*   PTT seconds 68* 68* 78* 86* 51* 24 25 26          Results from last 7 days   Lab Units 11/11/22  1304 11/11/22  1119   LACTIC ACID mmol/L 1 4 2 2*     ABG:  Results from last 7 days   Lab Units 11/14/22  0730   PH ART  7 421   PCO2 ART mm Hg 78 6*   PO2 ART mm Hg 62 1*   HCO3 ART mmol/L 50 0*   BASE EXC ART mmol/L 21 2   ABG SOURCE  Radial, Left     VBG:  Results from last 7 days   Lab Units 11/14/22  2205 11/14/22  0730   PH AL  7 385  --    PCO2 AL mm Hg 90 6*  --    PO2 AL mm Hg 46 5*  --    HCO3 AL mmol/L 53 0*  --    BASE EXC AL mmol/L 22 7  --    ABG SOURCE   --  Radial, Left     Results from last 7 days   Lab Units 11/11/22  1056   PROCALCITONIN ng/ml 0 19       Micro  Results from last 7 days   Lab Units 11/11/22  1119   BLOOD CULTURE  No Growth at 72 hrs  No Growth at 72 hrs  EKG: n/a  Imaging: n/a I have personally reviewed pertinent reports  Intake and Output  I/O       11/13 0701 11/14 0700 11/14 0701  11/15 0700    P  O  380     I V  (mL/kg) 832 3 (16 4)     IV Piggyback 80     Total Intake(mL/kg) 1292 3 (25 4)     Urine (mL/kg/hr) 2375 (1 9) 2800 (2 3)    Total Output 2375 2800    Net -1082 7 -2800                  Height and Weights   Height: 5' 6" (167 6 cm)     Body mass index is 18 09 kg/m²  Weight (last 2 days)     Date/Time Weight    11/14/22 0600 50 8 (112 1)    11/13/22 0548 49 9 (110)            Nutrition       Diet Orders   (From admission, onward)             Start     Ordered    11/14/22 0626  Diet Cardiovascular; Cardiac; Fluid Restriction 1800 ML, Consistent Carbohydrate Diet Level 2 (5 carb servings/75 grams CHO/meal), Sodium 2 GM  Diet effective midnight        References:    Nutrtion Support Algorithm Enteral vs  Parenteral   Question Answer Comment   Diet Type Cardiovascular    Cardiac Cardiac    Other Restriction(s): Fluid Restriction 1800 ML    Other Restriction(s): Consistent Carbohydrate Diet Level 2 (5 carb servings/75 grams CHO/meal)    Other Restriction(s): Sodium 2 GM    RD to adjust diet per protocol?  Yes        11/14/22 0626                    Active Medications  Scheduled Meds:  Current Facility-Administered Medications   Medication Dose Route Frequency Provider Last Rate   • albuterol  2 5 mg Nebulization Q4H PRN Henna Perera MD     • carvedilol  3 125 mg Oral BID With Meals Coty Oden MD     • digoxin  125 mcg Intravenous Every Other Day Coty Oden MD     • furosemide  80 mg Intravenous BID (diuretic) Natty Kang MD     • heparin (porcine)  3-20 Units/kg/hr (Order-Specific) Intravenous Titrated Peyton Pascual MD 18 Units/kg/hr (11/14/22 0400)   • heparin (porcine)  1,950 Units Intravenous Q1H PRN Richard Martin MD     • heparin (porcine)  3,900 Units Intravenous Q1H PRN Richard Martin MD     • hydrALAZINE  75 mg Oral [facility] Trevor Hearn MD     • insulin lispro  1-5 Units Subcutaneous TID [facility] Lisa East MD     • isosorbide dinitrate  30 mg Oral Q8H Trevor Hearn MD     • nitroGLYcerin  5-200 mcg/min Intravenous Titrated Trevor Hearn MD 50 mcg/min (11/14/22 1323)     Continuous Infusions:  heparin (porcine), 3-20 Units/kg/hr (Order-Specific), Last Rate: 18 Units/kg/hr (11/14/22 0400)  nitroGLYcerin, 5-200 mcg/min, Last Rate: 50 mcg/min (11/14/22 1323)      PRN Meds:   albuterol, 2 5 mg, Q4H PRN  heparin (porcine), 1,950 Units, Q1H PRN  heparin (porcine), 3,900 Units, Q1H PRN        Allergies   No Known Allergies  ---------------------------------------------------------------------------------------  Advance Directive and Living Will:      Power of :    POLST:    ---------------------------------------------------------------------------------------        Te Wilks,       Portions of the record may have been created with voice recognition software  Occasional wrong word or "sound a like" substitutions may have occurred due to the inherent limitations of voice recognition software    Read the chart carefully and recognize, using context, where substitutions have occurred

## 2022-11-15 NOTE — PROGRESS NOTES
Heart Failure/ Pulmonary Hypertension Progress Note - Pradeep Roman 71 y o  female MRN: [id number]    Unit/Bed#: [facility] 201-01 Encounter: [id number]      Assessment:    Principal Problem:    Acute respiratory failure with hypoxia (HCC)  Active Problems:    Atrial flutter with rapid ventricular response (HCC)    Renal mass    Volume overload    Hernia of abdominal wall    Aneurysm of infrarenal abdominal aorta      Objective:   Intake/ Output: 1371/3250: -1879 w/ furosemide 60 mg IV, 80 mg IV and 20 mg IV  Weight:   Tele:      MAPs:  mmHg    CVO2: 81%  ABG 11/15 3:30 am: pCO2: 122- now on BiPap  K 3 2  CO2 > 45    # acute hypoxic/ hypercapneic respiratory insufficiency  Left pleural effusion  - on BiPap     # Hypertensive urgency  Rx: coreg 3 125 mg BID, hydralazine 75 mg Q8, isordil 30 mg Q8, nitro gtt     # New HFrEF, Stage C, NYHA III  Etiology: possible valvular vs HTN as LVH on EKG, need to rule out ischemic component     Weight:112 lbs- bed scale  NT proBNP: 20923     Studies- personally reviewed by me     EKG- LVH     Echocardiogram 11/12/22:  LVEF: 30%, moderate LVH  LVIDd: 5 1 cm  RV: normal  MR: mild  PASP: 55 mmHg  RVOT:   Other: AV severely calcified, mean gradient of 19 mmHg, DI 0 3, DURGA 0 86 cm2        Neurohormonal Blockade:  --Beta-Blocker: coreg 3 125 mg BID  --ACEi, ARB or ARNi:    (or SVR reduction) hydralazine 75 mg Q8, imdur 30 mg Q8  --Aldosterone Receptor Blocker:  --SGLT2-I:   --Diuretic: lasix 80 mg IV BID     Sudden Cardiac Death Risk Reduction:  --ICD:      Electrical Resynchronization:  Narrow QRS       # Severe aortic stenosis  # Atrial flutter with RVR  - presented in atrial flutter with rates in 120's  AC: heparin gtt  Rate: digoxin 125 mcg daily, coreg 3 125 mg BID  # abdominal aortic aneurysm  # nicotine use- 87 pk year  # likely emphysema  # hyperlipidemia  # liver mass  # renal mass - on CT 11/11/12  # severe protein calorie malnutrition as evidenced by temporal and shoulder wasting     Plan:  80 lb wt loss in last year per pt and daughter, cachectic on exam- ongoing malignancy workup  Continue BiPap for hypercarbic resp failure  Not a candidate for TAVR  Currently maintaining SR ( was in atrial flutter with RVR)  Primary respiratory acidosis  Wean off nitro gtt as diuresing and increase iso/ hydral, add ACE       Review of Systems   Constitutional: Positive for fatigue and unexpected weight change (80 lb weight loss one year)  Negative for activity change and appetite change  HENT: Negative for congestion and nosebleeds  Eyes: Negative  Respiratory: Positive for shortness of breath  Negative for cough and chest tightness  Cardiovascular: Negative for chest pain, palpitations and leg swelling  Gastrointestinal: Negative for abdominal distention  Endocrine: Negative  Genitourinary: Negative  Musculoskeletal: Negative  Skin: Negative  Neurological: Negative for dizziness, syncope and weakness  Hematological: Negative  Psychiatric/Behavioral: Negative  [employer] (day, reason): Saunders catheter (day, reason):    Vitals: Blood pressure 157/52, pulse 64, temperature 97 6 °F (36 4 °C), temperature source Axillary, resp  rate 15, height 5' 6" (1 676 m), weight 50 8 kg (112 lb 1 6 oz), SpO2 90 %  , Body mass index is 18 09 kg/m² , I/O last 3 completed shifts: In: 1370 7 [I V :1370 7]  Out: 3950 [Urine:3950]  No intake/output data recorded  Wt Readings from Last 3 Encounters:   11/14/22 50 8 kg (112 lb 1 6 oz)   11/11/22 68 kg (150 lb)       Intake/Output Summary (Last 24 hours) at 11/15/2022 0732  Last data filed at 11/15/2022 0426  Gross per 24 hour   Intake 1370 7 ml   Output 2950 ml   Net -1579 3 ml     I/O last 3 completed shifts: In: 1370 7 [I V :1370 7]  Out: 9449 [Urine:3950]    No significant arrhythmias seen on telemetry review         Physical Exam:  Vitals:    11/15/22 0600 11/15/22 0610 11/15/22 0620 11/15/22 0630   BP: 162/51 160/50 158/52 157/52   Pulse: 56 (!) 54 (!) 54 64   Resp:       Temp:       TempSrc:       SpO2: (!) 89% (!) 89% (!) 89% 90%   Weight:       Height:           GEN: Brianne Taylor cachectic  HEENT: pupils equal, round, and reactive to light; extraocular muscles intact  NECK: supple, no carotid bruits   HEART: regular rhythm, normal S1 and S2, no murmurs, clicks, gallops or rubs, JVP is    LUNGS: clear to auscultation bilaterally; no wheezes, rales, or rhonchi   ABDOMEN: normal bowel sounds, soft, no tenderness, no distention  EXTREMITIES: peripheral pulses normal; no clubbing, cyanosis, or edema  NEURO: no focal findings   SKIN: normal without suspicious lesions on exposed skin      Current Facility-Administered Medications:   •  albuterol inhalation solution 2 5 mg, 2 5 mg, Nebulization, Q4H PRN, Chris Combs MD  •  carvedilol (COREG) tablet 3 125 mg, 3 125 mg, Oral, BID With Meals, Rhoda Jose MD, 3 125 mg at 11/14/22 1527  •  digoxin (LANOXIN) injection 125 mcg, 125 mcg, Intravenous, Every Other Day, Rhoda Jose MD  •  furosemide (LASIX) injection 80 mg, 80 mg, Intravenous, BID (diuretic), Kaila Garcia MD, 80 mg at 11/14/22 1515  •  heparin (porcine) 25,000 units in 0 45% NaCl 250 mL infusion (premix), 3-20 Units/kg/hr (Order-Specific), Intravenous, Titrated, Sandy Whalen MD, Last Rate: 13 mL/hr at 11/15/22 0715, 20 Units/kg/hr at 11/15/22 0715  •  heparin (porcine) injection 1,950 Units, 1,950 Units, Intravenous, Q1H PRN, Sandy Whalen MD, 1,950 Units at 11/12/22 1235  •  heparin (porcine) injection 3,900 Units, 3,900 Units, Intravenous, Q1H PRN, Sandy Whalen MD  •  hydrALAZINE (APRESOLINE) tablet 75 mg, 75 mg, Oral, Q8H [facility], Kaila Garcia MD  •  insulin lispro (HumaLOG) 100 units/mL subcutaneous injection 1-5 Units, 1-5 Units, Subcutaneous, TID AC **AND** Fingerstick Glucose (POCT), , , TID AC, Anil Clayton MD  •  isosorbide dinitrate (ISORDIL) tablet 30 mg, 30 mg, Oral, Q8H, Susi Orozco MD, 30 mg at 11/14/22 2137  •  nitroGLYcerin (TRIDIL) 50 mg in 250 mL infusion (premix), 5-200 mcg/min, Intravenous, Titrated, Susi Orozco MD, Last Rate: 21 mL/hr at 11/15/22 0658, 70 mcg/min at 11/15/22 0658      Labs & Results:        Results from last 7 days   Lab Units 11/15/22  0504 11/14/22  0559 11/13/22  1333   WBC Thousand/uL 7 89 9 06 13 03*   HEMOGLOBIN g/dL 11 8 11 7 12 1   HEMATOCRIT % 38 6 38 7 37 9   PLATELETS Thousands/uL 83* 89* 99*         Results from last 7 days   Lab Units 11/15/22  0504 11/14/22  1635 11/14/22  0535 11/13/22  0904 11/13/22  0544 11/12/22  1304   POTASSIUM mmol/L 3 2* 3 5 3 9   < > 3 6 3 5   CHLORIDE mmol/L 83* 84* 88*   < > 92* 96   CO2 mmol/L >45* >45* >45*   < > 44* 41*   BUN mg/dL 18 20 19   < > 24 30*   CREATININE mg/dL 0 59* 0 70 0 69   < > 0 80 0 81   CALCIUM mg/dL 9 0 9 2 8 7   < > 8 5 9 1   ALK PHOS U/L  --   --  57  --  67 82   ALT U/L  --   --  125*  --  156* 204*   AST U/L  --   --  53*  --  70* 87*    < > = values in this interval not displayed  Results from last 7 days   Lab Units 11/12/22  1127 11/11/22  2328 11/11/22  1056   INR  1 11 1 26* 1 22*         Counseling / Coordination of Care  Total floor / unit time spent today 25 minutes  Greater than 50% of total time was spent with the patient and / or family counseling and / or coordination of care  A description of the counseling / coordination of care: 15      Thank you for the opportunity to participate in the care of this patient    [address] [facility] PULMONARY HYPERTENSION  MEDICAL DIRECTOR OF [facility] Miamijohn Guido

## 2022-11-16 NOTE — ASSESSMENT & PLAN NOTE
On admission CT Abd Pelvis revealed complex 3 3 cm right lower pole renal mass with concern for Renal cell carcinoma  No prior work up  Cachetic appearing, hx of over 80lb unexplained weight loss over the past year     - continued comfort care measures only

## 2022-11-16 NOTE — PROGRESS NOTES
INTERNAL MEDICINE RESIDENCY TRANSFER ACCEPTANCE NOTE     Name: Titus Walker   Age & Sex: 71 y o  female   MRN: [id number]  Unit/Bed#: [facility] 201-01   Encounter: [id number]  Hospital Stay Days: 4    Accepting team: SOD Team B   Code Status: Level 4 - Comfort Care  Disposition: Patient requires Med/Surg    ASSESSMENT/PLAN     Principal Problem:    Acute respiratory failure with hypoxia ([address] )  Active Problems:    Atrial flutter with rapid ventricular response (HCC)    Renal mass    Volume overload    Hernia of abdominal wall    Aneurysm of infrarenal abdominal aorta      Aneurysm of infrarenal abdominal aorta  Assessment & Plan  In ED, CT revealed infrarenal abdominal aortic aneurysm measuring 4 5 cm with aortic stenosis and focal narrowing of the infrarenal aorta lumen to 10 mm    - continue comfort care measures only    Hernia of abdominal wall  Assessment & Plan  CT revealed Large bowel containing ventral hernia  - continue to monitor for signs of pain/strangulation including distention or guarding    - continue comfort care measures only    Volume overload  Assessment & Plan  Presented with hypervolemia, nt proBNP 20,923, and peripheral edema, HFrEF, pleural effusions  Patient diuresed with IV lasix  Now with 1+ pitting edema bilaterally  I/o  -1,357ml  - continue comfort care measures  - Will consider IV lasix dose if worsening signs of volume overload  - case in place for comfort    Renal mass  Assessment & Plan  On admission CT Abd Pelvis revealed complex 3 3 cm right lower pole renal mass with concern for Renal cell carcinoma  No prior work up  Cachetic appearing, hx of over 80lb unexplained weight loss over the past year     - continue comfort care measures only    Atrial flutter with rapid ventricular response (HCC)  Assessment & Plan  Presented with A flutter rates 120's, managed during hospital stay with rate control cardizem gtt, AC heparin gtt, digoxin 125 mcg, coreg 3 125 mg BID, hydralazine 75 mg Q8, isordil 30 mg Q8, nitro gtt  Rate is now controlled in 80's  /62 last checked  - continue comfort care measures only    * Acute respiratory failure with hypoxia (HCC)  Assessment & Plan  Presented with progressive fatigue, weight loss with continued hypoxic respiratory failure and LV decompensation despite BiPAP and continued diuresis  11/12/2022 Echo reading LVEF 30%, with severe global hypokinesis  With continued decline both patient, family, and care team decided on comfort care measures only as an inpatient  - continue morphine 2mg q 1 hr for pain, air hunger, distress  - continue Ativan 0 5mg q 20min for anxiety and agitation  - start glycopyrrolate 0 1 mg q 4 hours for gurgling and secretions        VTE Pharmacologic Prophylaxis: Reason for no pharmacologic prophylaxis comfort care  VTE Mechanical Prophylaxis: reason for no mechanical VTE prophylaxis comfort care    HOSPITAL COURSE     Per Dr Cristal Fournier transfer note,   "Upon presentation to [facility] patient arrived on nitro drip, heparin drip, and was continued on biPAP  VS were HR 100s afib, /64, satting 92% on 18/10 at 50%     Over the course the next few days, patient was diuresed with b i d  Lasix IV and maintained on and off of BiPAP  Patient was also being transitioned from nitro drip to goal-directed medical therapies including Isordil, Coreg, and hydralazine  However, her blood pressures remained high in the 190s  Patient required continuous BiPAP after needing it overnight on 11/14 to 11/15, and patient was offered therapeutic thoracentesis as a temporary measure before eventual need for intubation due to progression of hypoxia despite increase on BiPAP settings  Goals of care were discussed and patient changed her code status from level 1 to level 3 DNR/DNI  Hence, the thoracentesis was a palliative and therapeutic measure as the patient no longer wanted to be intubated    Patient initially consented to the procedure, but later changed her mind that afternoon and decided that she wanted to go comfort care  Patient was fully A&O x4 and had clear decision making status when going comfort  Family was able to arrive at bedside and was agreeable with patient's decision  Patient was transitioned to comfort care 11/15 in the afternoon, and maintain on critical care service overnight in the event of acute expiration and continuity of care  The following morning, she was comfortable and hemodynamically stable enough to be transferred to [place] [facility] surg  Family is agreeable to moving the patient out of ICCU if needed for staffing purposes "    SUBJECTIVE     Patient seen and examined at bedside  Patient is unable to arouse and somnolent  Nasal cannula in place  Per adult children in the room no noticeable signs of pain, distress, or air hunger  Patient only heard making soft gurgling noises  Patient's family is agreeable to plans for comfort care only and states they will continue to stay at bedside today  Unable to access ROS due to mental status  OBJECTIVE     Vitals:    11/15/22 2100 11/15/22 2300 11/16/22 0500 11/16/22 0830   BP: (!) 189/76 (!) 186/74 138/62    Pulse: 80 82 86    Resp:       Temp:       TempSrc:       SpO2: 90% (!) 89% (!) 89% (!) 85%   Weight:       Height:         I/O last 24 hours: In: 792 2 [I V :492 2; IV Piggyback:300]  Out: 2150 [Urine:2150]    Physical Exam  Constitutional:       General: She is sleeping  She is not in acute distress  Appearance: She is cachectic  She is ill-appearing (chronically)  Interventions: Nasal cannula in place  HENT:      Head: Normocephalic and atraumatic  Mouth/Throat:      Mouth: Mucous membranes are dry  Pharynx: Oropharynx is clear  Cardiovascular:      Rate and Rhythm: Normal rate  Rhythm irregular  Pulses: Decreased pulses  Pulmonary:      Effort: Pulmonary effort is normal       Breath sounds: Rales present     Abdominal: General: Abdomen is flat  There is no distension  Palpations: Abdomen is soft  Tenderness: There is no guarding or rebound  Musculoskeletal:      Right lower le+ Pitting Edema present  Left lower le+ Pitting Edema present  Skin:     General: Skin is dry  Coloration: Skin is pale  Neurological:      Mental Status: She is unresponsive  Comments: Sluggish pupils bilaterally to light       LABORATORY DATA     Labs: I have personally reviewed pertinent reports  Results from last 7 days   Lab Units 11/15/22  0504 22  0559 22  1333 22  2328 22  1056   WBC Thousand/uL 7 89 9 06 13 03*   < > 7 24   HEMOGLOBIN g/dL 11 8 11 7 12 1   < > 14 2   HEMATOCRIT % 38 6 38 7 37 9   < > 44 6   PLATELETS Thousands/uL 83* 89* 99*   < > 115*   NEUTROS PCT %  --   --   --   --  87*   MONOS PCT %  --   --   --   --  8   MONO PCT %  --   --  3*  --   --     < > = values in this interval not displayed  Results from last 7 days   Lab Units 11/15/22  1343 11/15/22  0504 22  1635 22  0535 22  0904 22  0544 22  1304   POTASSIUM mmol/L 3 6 3 2* 3 5 3 9   < > 3 6 3 5   CHLORIDE mmol/L 82* 83* 84* 88*   < > 92* 96   CO2 mmol/L >45* >45* >45* >45*   < > 44* 41*   BUN mg/dL 17 18 20 19   < > 24 30*   CREATININE mg/dL 0 62 0 59* 0 70 0 69   < > 0 80 0 81   CALCIUM mg/dL 9 0 9 0 9 2 8 7   < > 8 5 9 1   ALK PHOS U/L  --   --   --  57  --  67 82   ALT U/L  --   --   --  125*  --  156* 204*   AST U/L  --   --   --  53*  --  70* 87*    < > = values in this interval not displayed       Results from last 7 days   Lab Units 11/15/22  0504 22  0535 22  0544   MAGNESIUM mg/dL 1 9 1 7 1 4*     Results from last 7 days   Lab Units 11/15/22  0504 22  0535 22  0544   PHOSPHORUS mg/dL 2 3 2 7 2 8      Results from last 7 days   Lab Units 11/15/22  1343 11/15/22  0504 22  0607 22  1735 22  1127 22  0529 22  2320 11/11/22  1056   INR   --   --   --   --  1 11  --  1 26* 1 22*   PTT seconds 76* 57* 68*   < > 51*   < > 25 26    < > = values in this interval not displayed  Results from last 7 days   Lab Units 11/11/22  1304   LACTIC ACID mmol/L 1 4         Micro:  Lab Results   Component Value Date    BLOODCX No Growth After 4 Days  11/11/2022    BLOODCX No Growth After 4 Days  11/11/2022     IMAGING & DIAGNOSTIC TESTING     Imaging: I have personally reviewed pertinent reports  XR chest 1 view portable    Result Date: 11/12/2022  Impression: Worsening vascular congestion and increased moderate right pleural effusion  Unchanged moderate left pleural effusion  Workstation performed: [id number]     XR chest portable ICU    Result Date: 11/15/2022  Impression: Persistent large effusions and extensive bibasilar atelectasis  Workstation performed: [id number]     EKG, Pathology, and Other Studies: I have personally reviewed pertinent reports  ALLERGIES   No Known Allergies  MEDICATIONS     Current Facility-Administered Medications   Medication Dose Route Frequency Provider Last Rate   • glycopyrrolate  0 1 mg Intravenous Q4H PRN Lizandro Gallegos MD     • lidocaine   Topical Once Corry Noe MD     • LORazepam  0 5 mg Intravenous Q20 Min PRN Corry Noe MD     • morphine injection  2 mg Intravenous Q1H PRN Corry Noe MD          glycopyrrolate, 0 1 mg, Q4H PRN  LORazepam, 0 5 mg, Q20 Min PRN  morphine injection, 2 mg, Q1H PRN        Portions of the record may have been created with voice recognition software  Occasional wrong word or "sound a like" substitutions may have occurred due to the inherent limitations of voice recognition software    Read the chart carefully and recognize, using context, where substitutions have occurred     ==  [address]  Internal Medicine Residency Sub-I  Jon Merida MS IV

## 2022-11-16 NOTE — PROGRESS NOTES
Spiritual Care Progress Note    2022  Patient: Wilhelmenia Boast : 1950  Admission Date & Time: 2022 1191  MRN: [id number] CSN: [id number]      [facility] visited with pt and family from a referral from the overnight    chatted with family about pt and her life, facilitated story telling, and learned about the pt through the family's eyes  Family is grieving as they face the pt's end of life  Family appreciated the visit and time spent with the pt   provided a space for the family to process their feelings around the pt's death   offered words of comfort and prayed with and for the pt's peaceful and comfortable passing and for peace for the family  Chaplains remain available                 Chaplaincy Interventions Utilized:   Empowerment: Facilitated group experience and Provided chaplaincy education    Exploration: Explored emotional needs & resources, Explored relational needs & resources, and Explored spiritual needs & resources    Collaboration: Facilitated respect for spiritual/cultural practice during hospitalization    Relationship Building: Cultivated a relationship of care and support and Listened empathically    Ritual: Provided prayer    Chaplaincy Outcomes Achieved:  Developed chaplaincy care plan, Expressed gratitude, Expressed peace, Expressed ultimate hope, and Tearfully processed emotions    Spiritual Coping Strategies Utilized:   Connectedness and Spiritual comfort       22 1300   Clinical Encounter Type   Visited With Patient and family together   Routine Visit Follow-up   Crisis Visit Patient actively dying   Referral From    Referral To    Quaker Encounters   Quaker Needs Prayer

## 2022-11-16 NOTE — PROGRESS NOTES
Heart Failure/ Pulmonary Hypertension Progress Note - Titus Walker 71 y o  female MRN: [id number]    [facility] Encounter: [id number]      Assessment:    Principal Problem:    Acute respiratory failure with hypoxia (HCC)  Active Problems:    Atrial flutter with rapid ventricular response (HCC)    Renal mass    Volume overload    Hernia of abdominal wall    Aneurysm of infrarenal abdominal aorta      Objective:   Intake/ Output: 792/1350: -558 w/  Weight: 112 lbs bed scale  Tele:      MAPs: 100's  Wide pulse pressure    CXR- large bilateral pleural effusions    CVO2: 81%  ABG 11/15 3:30 am: pCO2: 122- now on BiPap  K 3 2  CO2 > 45    Pt now made comfort care    # acute hypoxic/ hypercapneic respiratory insufficiency  Left pleural effusion  - on BiPap     # Hypertensive urgency  Rx: coreg 3 125 mg BID, hydralazine 75 mg Q8, isordil 30 mg Q8, nitro gtt     # New HFrEF, Stage C, NYHA III  Etiology: possible valvular vs HTN as LVH on EKG, need to rule out ischemic component     Weight:112 lbs- bed scale  NT proBNP: 20923     Studies- personally reviewed by me     EKG- LVH     Echocardiogram 11/12/22:  LVEF: 30%, moderate LVH  LVIDd: 5 1 cm  RV: normal  MR: mild  PASP: 55 mmHg  RVOT:   Other: AV severely calcified, mean gradient of 19 mmHg, DI 0 3, DURGA 0 86 cm2        Neurohormonal Blockade:  --Beta-Blocker: coreg 3 125 mg BID  --ACEi, ARB or ARNi:    (or SVR reduction) hydralazine 75 mg Q8, imdur 30 mg Q8  --Aldosterone Receptor Blocker:  --SGLT2-I:   --Diuretic: lasix 80 mg IV BID     Sudden Cardiac Death Risk Reduction:  --ICD:      Electrical Resynchronization:  Narrow QRS       # Severe aortic stenosis  # Atrial flutter with RVR  - presented in atrial flutter with rates in 120's  AC: heparin gtt  Rate: digoxin 125 mcg daily, coreg 3 125 mg BID  # abdominal aortic aneurysm  # nicotine use- 87 pk year  # likely emphysema  # hyperlipidemia  # liver mass  # renal mass - on CT 11/11/12  # severe protein calorie malnutrition as evidenced by temporal and shoulder wasting     Plan:  80 lb wt loss in last year per pt and daughter, cachectic on exam-suspect malignancy  Continue BiPap for hypercarbic resp failure  Agree with comfort care as not a candidate for interventions       Review of Systems   Constitutional: Positive for fatigue and unexpected weight change (80 lb weight loss one year)  Negative for activity change and appetite change  HENT: Negative for congestion and nosebleeds  Eyes: Negative  Respiratory: Positive for shortness of breath  Negative for cough and chest tightness  Cardiovascular: Negative for chest pain, palpitations and leg swelling  Gastrointestinal: Negative for abdominal distention  Endocrine: Negative  Genitourinary: Negative  Musculoskeletal: Negative  Skin: Negative  Neurological: Negative for dizziness, syncope and weakness  Hematological: Negative  Psychiatric/Behavioral: Negative  [employer] (day, reason): Saunders catheter (day, reason):    Vitals: Blood pressure 138/62, pulse 86, temperature 97 7 °F (36 5 °C), temperature source Oral, resp  rate 15, height 5' 6" (1 676 m), weight 50 8 kg (112 lb 1 6 oz), SpO2 (!) 89 %  , Body mass index is 18 09 kg/m² , I/O last 3 completed shifts: In: 2162 9 [I V :1862 9; IV Piggyback:300]  Out: 2300 [Urine:2300]  No intake/output data recorded  Wt Readings from Last 3 Encounters:   11/14/22 50 8 kg (112 lb 1 6 oz)   11/11/22 68 kg (150 lb)       Intake/Output Summary (Last 24 hours) at 11/16/2022 0745  Last data filed at 11/15/2022 1901  Gross per 24 hour   Intake 792 17 ml   Output 1350 ml   Net -557 83 ml     I/O last 3 completed shifts: In: 2162 9 [I V :1862 9; IV Piggyback:300]  Out: 2300 [Urine:2300]    No significant arrhythmias seen on telemetry review         Physical Exam:  Vitals:    11/15/22 2000 11/15/22 2100 11/15/22 2300 11/16/22 0500   BP: (!) 189/73 (!) 189/76 (!) 186/74 138/62 Pulse: 78 80 82 86   Resp:       Temp:       TempSrc:       SpO2: 90% 90% (!) 89% (!) 89%   Weight:       Height:           GEN: Kenan Taylor cachectic  HEENT: pupils equal, round, and reactive to light; extraocular muscles intact  NECK: supple, no carotid bruits   HEART: regular rhythm, normal S1 and S2, no murmurs, clicks, gallops or rubs, JVP is    LUNGS: clear to auscultation bilaterally; no wheezes, rales, or rhonchi   ABDOMEN: normal bowel sounds, soft, no tenderness, no distention  EXTREMITIES: peripheral pulses normal; no clubbing, cyanosis, or edema  NEURO: no focal findings   SKIN: normal without suspicious lesions on exposed skin      Current Facility-Administered Medications:   •  lidocaine (LMX) 4 % cream, , Topical, Once, Izzy Ho MD  •  LORazepam (ATIVAN) injection 0 5 mg, 0 5 mg, Intravenous, Q20 Min PRN, Izzy Ho MD  •  morphine injection 2 mg, 2 mg, Intravenous, Q1H PRN, Izzy Ho MD, 2 mg at 11/16/22 0637      Labs & Results:        Results from last 7 days   Lab Units 11/15/22  0504 11/14/22  0559 11/13/22  1333   WBC Thousand/uL 7 89 9 06 13 03*   HEMOGLOBIN g/dL 11 8 11 7 12 1   HEMATOCRIT % 38 6 38 7 37 9   PLATELETS Thousands/uL 83* 89* 99*         Results from last 7 days   Lab Units 11/15/22  1343 11/15/22  0504 11/14/22  1635 11/14/22  0535 11/13/22  0904 11/13/22  0544 11/12/22  1304   POTASSIUM mmol/L 3 6 3 2* 3 5 3 9   < > 3 6 3 5   CHLORIDE mmol/L 82* 83* 84* 88*   < > 92* 96   CO2 mmol/L >45* >45* >45* >45*   < > 44* 41*   BUN mg/dL 17 18 20 19   < > 24 30*   CREATININE mg/dL 0 62 0 59* 0 70 0 69   < > 0 80 0 81   CALCIUM mg/dL 9 0 9 0 9 2 8 7   < > 8 5 9 1   ALK PHOS U/L  --   --   --  57  --  67 82   ALT U/L  --   --   --  125*  --  156* 204*   AST U/L  --   --   --  53*  --  70* 87*    < > = values in this interval not displayed       Results from last 7 days   Lab Units 11/12/22  1127 11/11/22  2328 11/11/22  1056   INR  1 11 1 26* 1 22* Counseling / Coordination of Care  Total floor / unit time spent today 25 minutes  Greater than 50% of total time was spent with the patient and / or family counseling and / or coordination of care  A description of the counseling / coordination of care: 15      Thank you for the opportunity to participate in the care of this patient    [address] [facility] PULMONARY HYPERTENSION  MEDICAL DIRECTOR OF [place]

## 2022-11-16 NOTE — PROGRESS NOTES
Daily Progress Note - Critical Care   Joel Olsen 71 y o  female MRN: [id number]  Unit/Bed#: [facility] 201-01 Encounter: [id number]      ----------------------------------------------------------------------------------------  HPI/24hr events: Transitioned to comfort care yesterday  No events overnight      ---------------------------------------------------------------------------------------  SUBJECTIVE  N/A    Review of Systems   Unable to perform ROS: Mental status change     Review of systems was unable to be performed secondary to altered mental status  ---------------------------------------------------------------------------------------  Assessment and Plan:    1  Acute hypoxic respiratory failure- 2/2 acute HF  2  Acute on chronic hypercapneic respiratory failure- pCO2 from [place] yesterday was 122  This morning is 76  3  Afib- continues to be in sinus  4  Uncontrolled HTN- on nitro drip  5  Thrombocytopenia- unclear etiology  6  Moderate to severe AS  7  Large bilateral pleural effusions- 2/2 HF  8  Pulm HTN- PASP 55mmHg  9  Renal mass- concerning for RCC  10  Liver mass- unclear etiology, possible metastatic lesion  11  Large ventral hernia  12  Abdominal aortic aneurysm  13  Transaminitis- improving    Neuro:   • Diagnosis: Altered mental status 2/2 hypercarbia   o Plan: Comfort measures only      CV:   • Diagnosis: Afib  o Plan: Comfort measures only  • Diagnosis: Acute CHF 30% LV EF   o Plan: Comfort measures only  o Patient declined palliative thoracentesis yesterday, fully aware of risks and benefits      Pulm:  • Diagnosis: Acute hypoxic, hypercapnic respiratory failure  o Plan: Comfort measures only  o 0 5 mg ativan IV q20 PRN for anxiety  o 2 mg morphine q1h for increased WOB/pain/air hunger      GI:   • Diagnosis: Ventral hernia  o Plan: Comfort measures only  o Pleasure feeds  • Diagnosis: Abdominal aortic aneurysm  o Plan: Comfort measures only      :   • Diagnosis: N/A  o Plan:  Saunders in for comfort  If causing distress Ok to remove      F/E/N:   • Plan: Pleasure feeds  No labs      Heme/Onc:   o Diagnosis: Possible renal cell carcinoma - comfort measures only      Endo:   o Diagnosis: Pre DM - comfort measures only no accuchecks      ID:   o Diagnosis: No acute issues      MSK/Skin:   o Diagnosis:LE swelling 2/2 CHF  o Local wound care  o Comfort measures only    Patient appropriate for transfer out of the ICU today?: Patient does not meet criteria for ICU Follow-up Clinic; referral has not been made  Disposition: Transfer to Med-Surg   Code Status: Level 4 - Comfort Care  ---------------------------------------------------------------------------------------  ICU CORE MEASURES    Prophylaxis   VTE Pharmacologic Prophylaxis: not indicated  comfort only  VTE Mechanical Prophylaxis: reason for no mechanical VTE prophylaxis not indicated  comfort only  Stress Ulcer Prophylaxis: Prophylaxis Not Indicated     ABCDE Protocol (if indicated)  Plan to perform spontaneous awakening trial today? Not applicable  Plan to perform spontaneous breathing trial today? Not applicable  Obvious barriers to extubation? Not applicable  CAM-ICU: Positive    Invasive Devices Review  Invasive Devices     Peripheral Intravenous Line  Duration           Peripheral IV 22 Left;Ventral (anterior) Forearm 3 days    Peripheral IV 11/15/22 Right;Ventral (anterior) Forearm 1 day          Drain  Duration           Urethral Catheter 16 Fr  3 days              Can any invasive devices be discontinued today?  No  ---------------------------------------------------------------------------------------  OBJECTIVE    Vitals   Vitals:    11/15/22 2000 11/15/22 2100 11/15/22 2300 22 0500   BP: (!) 189/73 (!) 189/76 (!) 186/74 138/62   Pulse: 78 80 82 86   Resp:       Temp:       TempSrc:       SpO2: 90% 90% (!) 89% (!) 89%   Weight:       Height:         Temp (24hrs), Av 8 °F (36 6 °C), Min:97 7 °F (36 5 °C), Max:97 9 °F (36 6 °C)  Current: Temperature: 97 7 °F (36 5 °C)  /62   Pulse 86   Temp 97 7 °F (36 5 °C) (Oral)   Resp 15   Ht 5' 6" (1 676 m)   Wt 50 8 kg (112 lb 1 6 oz)   SpO2 (!) 89%   BMI 18 09 kg/m²       Respiratory:  SpO2: SpO2: (!) 89 %    Nasal Cannula O2 Flow Rate (L/min): 15 L/min    Invasive/non-invasive ventilation settings   Respiratory    Lab Data (Last 4 hours)    None         O2/Vent Data (Last 4 hours)    None                Physical Exam  Vitals reviewed  Constitutional:       Comments: Appears somnolent, shallow breathing  Wearing nasal cannula   Eyes:      Comments: Pinpoint pupils, equal   Cardiovascular:      Rate and Rhythm: Rhythm irregular  Comments: Irregularly irregular  Pulmonary:      Breath sounds: Rales present  No wheezing  Comments: Deep, slow breathing  Abdominal:      Palpations: Abdomen is soft  Tenderness: There is no guarding  Musculoskeletal:      Right lower leg: Edema present  Left lower leg: Edema present  Skin:     General: Skin is warm and dry               Laboratory and Diagnostics:  Results from last 7 days   Lab Units 11/15/22  0504 11/14/22  0559 11/13/22  1333 11/12/22  1127 11/11/22  2328 11/11/22  1056   WBC Thousand/uL 7 89 9 06 13 03* 9 72 7 07 7 24   HEMOGLOBIN g/dL 11 8 11 7 12 1 13 8 13 5 14 2   HEMATOCRIT % 38 6 38 7 37 9 43 5 43 1 44 6   PLATELETS Thousands/uL 83* 89* 99* 118* 104* 115*   NEUTROS PCT %  --   --   --   --   --  87*   BANDS PCT %  --   --  14*  --   --   --    MONOS PCT %  --   --   --   --   --  8   MONO PCT %  --   --  3*  --   --   --      Results from last 7 days   Lab Units 11/15/22  1343 11/15/22  0504 11/14/22  1635 11/14/22  0535 11/13/22  1407 11/13/22  0904 11/13/22  0544 11/12/22  1304 11/11/22  1056   SODIUM mmol/L 134* 135 133* 138 136 140 138 139 138   POTASSIUM mmol/L 3 6 3 2* 3 5 3 9 3 2* 3 5 3 6 3 5 4 9   CHLORIDE mmol/L 82* 83* 84* 88* 89* 91* 92* 96 98   CO2 mmol/L >45* >45* >45* >45* >45* >45* 44* 41* 36*   ANION GAP mmol/L  --   --   --   --   --   --  2* 2* 4   BUN mg/dL 17 18 20 19 22 22 24 30* 46*   CREATININE mg/dL 0 62 0 59* 0 70 0 69 0 76 0 79 0 80 0 81 1 15   CALCIUM mg/dL 9 0 9 0 9 2 8 7 8 6 8 8 8 5 9 1 9 4   GLUCOSE RANDOM mg/dL 99 111 169* 115 175* 137 150* 126 138   ALT U/L  --   --   --  125*  --   --  156* 204* 289*   AST U/L  --   --   --  53*  --   --  70* 87*  --    ALK PHOS U/L  --   --   --  57  --   --  67 82 84   ALBUMIN g/dL  --   --   --  2 1*  --   --  2 3* 2 7* 2 8*   TOTAL BILIRUBIN mg/dL  --   --   --  1 30*  --   --  1 48* 1 58* 1 72*     Results from last 7 days   Lab Units 11/15/22  0504 11/14/22  0535 11/13/22  0544 11/11/22  1056   MAGNESIUM mg/dL 1 9 1 7 1 4* 2 0   PHOSPHORUS mg/dL 2 3 2 7 2 8  --       Results from last 7 days   Lab Units 11/15/22  1343 11/15/22  0504 11/14/22  0607 11/13/22  0544 11/13/22  0015 11/12/22  1735 11/12/22  1127 11/12/22  0529 11/11/22  2328 11/11/22  1056   INR   --   --   --   --   --   --  1 11  --  1 26* 1 22*   PTT seconds 76* 57* 68* 68* 78* 86* 51*   < > 25 26    < > = values in this interval not displayed  Results from last 7 days   Lab Units 11/11/22  1304 11/11/22  1119   LACTIC ACID mmol/L 1 4 2 2*     ABG:  Results from last 7 days   Lab Units 11/15/22  0331   PH ART  7 361   PCO2 ART mm Hg 122 6*   PO2 ART mm Hg 83 1   HCO3 ART mmol/L 67 9*   BASE EXC ART mmol/L 34 4   ABG SOURCE  Radial, Left     VBG:  Results from last 7 days   Lab Units 11/15/22  0923 11/15/22  0331   PH AL  7 438*  --    PCO2 AL mm Hg 76 4*  --    PO2 AL mm Hg 48 6*  --    HCO3 AL mmol/L 50 5*  --    BASE EXC AL mmol/L 21 8  --    ABG SOURCE   --  Radial, Left     Results from last 7 days   Lab Units 11/11/22  1056   PROCALCITONIN ng/ml 0 19       Micro  Results from last 7 days   Lab Units 11/11/22  1119   BLOOD CULTURE  No Growth After 4 Days  No Growth After 4 Days  EKG: Afib  Imaging: No new I have personally reviewed pertinent reports  Intake and Output  I/O       11/14 0701  11/15 0700 11/15 0701 11/16 0700    I V  (mL/kg) 1370 7 (27) 492 2 (9 7)    IV Piggyback  300    Total Intake(mL/kg) 1370 7 (27) 792 2 (15 6)    Urine (mL/kg/hr) 3250 (2 7) 1350 (1 1)    Total Output 3250 1350    Net -1879 3 -557 8              UOP: 56 ml/hr     Height and Weights   Height: 5' 6" (167 6 cm)     Body mass index is 18 09 kg/m²  Weight (last 2 days)     Date/Time Weight    11/14/22 0600 50 8 (112 1)            Nutrition       Diet Orders   (From admission, onward)             Start     Ordered    11/15/22 1442  Diet Regular; Pleasure Feed  Diet effective now        References:    Nutrtion Support Algorithm Enteral vs  Parenteral   Question Answer Comment   Diet Type Regular    Regular Pleasure Feed    RD to adjust diet per protocol? No        11/15/22 1443              Pleasure feeds      Active Medications  Scheduled Meds:  Current Facility-Administered Medications   Medication Dose Route Frequency Provider Last Rate   • lidocaine   Topical Once Sugar Cox MD     • morphine injection  2 mg Intravenous Q1H PRN Sugar Cox MD       Continuous Infusions:     PRN Meds:   morphine injection, 2 mg, Q1H PRN        Allergies   No Known Allergies  ---------------------------------------------------------------------------------------  Advance Directive and Living Will:      Power of :    POLST:    ---------------------------------------------------------------------------------------  Care Time Delivered:   Upon my evaluation, this patient had a high probability of imminent or life-threatening deterioration due to CHF, which required my direct attention, intervention, and personal management  I have personally provided 20 minutes (7499 to ) of critical care time, exclusive of procedures, teaching, family meetings, and any prior time recorded by providers other than myself       Sugar Cox MD      Portions of the record may have been created with voice recognition software  Occasional wrong word or "sound a like" substitutions may have occurred due to the inherent limitations of voice recognition software    Read the chart carefully and recognize, using context, where substitutions have occurred

## 2022-11-16 NOTE — ASSESSMENT & PLAN NOTE
In ED, CT revealed infrarenal abdominal aortic aneurysm measuring 4 5 cm with aortic stenosis and focal narrowing of the infrarenal aorta lumen to 10 mm    - continued comfort care measures only

## 2022-11-16 NOTE — ASSESSMENT & PLAN NOTE
Presented with progressive fatigue, weight loss with continued hypoxic respiratory failure and LV decompensation despite BiPAP and continued diuresis  11/12/2022 Echo reading LVEF 30%, with severe global hypokinesis  With continued decline both patient, family, and care team decided on comfort care measures only as an inpatient      Plan prior to expiration:   - continue morphine 2mg q 1 hr for pain, air hunger, distress  - continue Ativan 0 5mg q 20min for anxiety and agitation  - start glycopyrrolate 0 1 mg q 4 hours for gurgling and secretions

## 2022-11-16 NOTE — ASSESSMENT & PLAN NOTE
Presented with hypervolemia, nt proBNP 20,923, and peripheral edema, HFrEF, pleural effusions  Patient diuresed with IV lasix  Now with 1+ pitting edema bilaterally  I/o  -1,357ml     - continued comfort care measures  - case in place for comfort

## 2022-11-16 NOTE — PROGRESS NOTES
MICU to SOD Transfer Note    Code Status: Level 4 - Comfort Care  POA:    POLST:      Reason for ICU admission:   Acute congestive heart failure requiring continuous BiPAP    Active problems:   Principal Problem:    Acute respiratory failure with hypoxia (HCC)  Active Problems:    Atrial flutter with rapid ventricular response (HCC)    Renal mass    Volume overload    Hernia of abdominal wall    Aneurysm of infrarenal abdominal aorta  Resolved Problems:    * No resolved hospital problems  *    * Acute respiratory failure with hypoxia (HCC)  Assessment & Plan  Comfort measures only  Morphine 2 mg q hour for air hunger/pain/increased work of breathing  0 2 mg Ativan Q 20 minutes for anxiety    Aneurysm of infrarenal abdominal aorta  Assessment & Plan  Comfort measures only    Hernia of abdominal wall  Assessment & Plan  Comfort measures only    Volume overload  Assessment & Plan  Comfort measures only  Saunders in place for comfort    Renal mass  Assessment & Plan  Comfort measures only    Atrial flutter with rapid ventricular response (HCC)  Assessment & Plan  Comfort measures only        Consultants:   Heart Failure    History of Present Illness:   Madison Arreguin is a 71 y o  female who presents with progressive fatigue x 6 weeks      Patient has not seen a physician in 40 years  Unvaccinated - no flu/tdap/pneumococcal vaccines  Denies recent sick contacts  Brought in by family out of concern for progressive weakness and sleeping for longer periods of time  Patient lives with 4 foster children ages 15-34 but they are all mentally disabled and are unable to help take care of her  They noted that the patient fell asleep in her car at one point when she was sitting in her car parked      Patient is typically fully capable of all ADLs and IADLs  She has also had 2 falls at home but denies LOC or headstrike   She denies lightheadedness or dizziness      Social: 87 pack year smoker, quit 6 weeks ago but did not say why or tell family why  Never rec drug user, including IDVU  Does not drink alcohol  Has three children  All present at bedside during interview  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 w/mental disabilities over the past several decades       Patient was brought from home to [facility] ED AM of 11/11 and EKG showed aflutter  Trops negative, proBNP 21k  Bedside echo showed severely impaired EF  BLE swelling present, reported as 4+, so duplex were ordered for BLE and negative for DVT  D-dimer (+), CTA PE ordered  Negative for PE  However, it showed radiographic findings of pulm HTN, bilateral pulmonary effusions, and 3 cm renal mass c/f renal cell carcinoma       Summary of clinical course:    Upon presentation to [facility] patient arrived on nitro drip, heparin drip, and was continued on biPAP  VS were HR 100s afib, /64, satting 92% on 18/10 at 50%     Over the course the next few days, patient was diuresed with b i d  Lasix IV and maintained on and off of BiPAP  Patient was also being transitioned from nitro drip to goal-directed medical therapies including Isordil, Coreg, and hydralazine  However, her blood pressures remained high in the 190s  Patient required continuous BiPAP after needing it overnight on 11/14 to 11/15, and patient was offered therapeutic thoracentesis as a temporary measure before eventual need for intubation due to progression of hypoxia despite increase on BiPAP settings  Goals of care were discussed and patient changed her code status from level 1 to level 3 DNR/DNI  Hence, the thoracentesis was a palliative and therapeutic measure as the patient no longer wanted to be intubated  Patient initially consented to the procedure, but later changed her mind that afternoon and decided that she wanted to go comfort care  Patient was fully A&O x4 and had clear decision making status when going comfort    Family was able to arrive at bedside and was agreeable with patient's decision  Patient was transitioned to comfort care 11/15 in the afternoon, and maintain on critical care service overnight in the event of acute expiration and continuity of care  The following morning, she was comfortable and hemodynamically stable enough to be transferred to [facility]  Family is agreeable to moving the patient out of ICCU if needed for staffing purposes  Recent or scheduled procedures:   none  Outstanding/pending diagnostics:   none    Cultures:   Bcx admission negative, COVID/flu/RSV negative       Mobilization Plan:   N/A    Nutrition Plan:   Pleasure feeds    Invasive Devices Review  Invasive Devices     Peripheral Intravenous Line  Duration           Peripheral IV 11/12/22 Left;Ventral (anterior) Forearm 3 days    Peripheral IV 11/15/22 Right;Ventral (anterior) Forearm 1 day          Drain  Duration           Urethral Catheter 16 Fr  3 days                Rationale for remaining devices: Saunders for comfort only    VTE Pharmacologic Prophylaxis: n/a  VTE Mechanical Prophylaxis: n/a    Discharge Plan:   Patient should be ready for discharge to home w/hospice services, but family prefers patient to pass naturally in the hospital as of this morning's discussions  Initial Physical Therapy Recommendations: NA  Initial Occupational Therapy Recommendations:NA  Initial /Plan: Family declining consult to inpatient hospice w/case management     Home medications that are not reordered and reason why:   N/A    Spoke with Dr Wilton Mcdonald  regarding transfer  Please contact critical care via [employer] with any questions or concerns  Portions of the record may have been created with voice recognition software  Occasional wrong word or "sound a like" substitutions may have occurred due to the inherent limitations of voice recognition software  Read the chart carefully and recognize, using context, where substitutions have occurred      Alex Ryan Jory Durbin MD   PGY-2 Internal Medicine  [facility]

## 2022-11-16 NOTE — ASSESSMENT & PLAN NOTE
Comfort measures only  Morphine 2 mg q hour for air hunger/pain/increased work of breathing  0 2 mg Ativan Q 20 minutes for anxiety

## 2022-11-16 NOTE — PROGRESS NOTES
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

## 2022-11-16 NOTE — DEATH NOTE
INPATIENT DEATH NOTE  Ewa Choi 71 y o  female MRN: [id number]  [facility]counter: [id number]    Date, Time and Cause of Death    Date of Death: 22  Time of Death:  2:20 PM  Preliminary Cause of Death: Acute HFrEF (heart failure with reduced ejection fraction) ([facility] [address] )  Entered by: Sumanth Watkins MD[[id number] [id number]]     Attribution     MN1 1 Sumanth Watikns MD 22 14:38           Patient's Information  Pronounced by: Sumanth Watkins MD  Did the patient's death occur in the ED?: No  Did the patient's death occur in the OR?: No  Did the patient's death occur less than 10 days post-op?: No  Did the patient's death occur within 24 hours of admission?: No  Was code status DNR at the time of death?: Yes    PHYSICAL EXAM:  Unresponsive to noxious stimuli, Spontaneous respirations absent, Breath sounds absent, Carotid pulse absent, Heart sounds absent, Pupillary light reflex absent and Corneal blink reflex absent    Medical Examiner notification criteria:  NONE APPLICABLE   Medical Examiner's office notified?:  No, does not meet ME notification criteria   Medical Examiner accepted case?:  No  Name of Medical Examiner: NA    Family Notification  Was the family notified?: Yes  Date Notified: 22  Time Notified: 1902  Notified by: Sumanth Watkins MD  Name of Family Notified of Death: Multiple family members at bedside   Relationship to Patient: Daughter, Other relative  Family Notification Route:  At bedside  Was the family told to contact a  home?: Yes  Name of  Home[de-identified] [facility]    Autopsy Options:  Post-mortem examination declined by next of kin    Primary Service Attending Physician notified?:  yes - Attending:  Caio Boyd MD    Physician/Resident responsible for completing Discharge Summary:  Sumanth Watkins MD

## 2022-11-16 NOTE — ASSESSMENT & PLAN NOTE
Presented with A flutter rates 120's, managed during hospital stay with rate control cardizem gtt, AC heparin gtt, digoxin 125 mcg, coreg 3 125 mg BID,  hydralazine 75 mg Q8, isordil 30 mg Q8, nitro gtt  Rate is now controlled in 80's  /62 last checked    - continued comfort care measures only

## 2022-11-16 NOTE — PROGRESS NOTES
Progress note - Palliative and Supportive Care   Rita Tatum 71 y o  female [id number]    Patient Active Problem List   Diagnosis   • Acute respiratory failure with hypoxia [facility])   • Atrial flutter with rapid ventricular response (HCC)   • Renal mass   • Volume overload   • Hernia of abdominal wall   • Aneurysm of infrarenal abdominal aorta     Active issues specifically addressed today include:   Acute hypoxic respiratory failure  Acute decompensated heart failure   Concern for underlying malignancy   Severe protein calorie malnutrition   Palliative care patient  Goals of care    Plan:  1  Symptom management - continue current comfort cares as is    -     2  Goals - level 4, comfort care  Discussed hospice transition and family would like to stay in hospital as they feel comfortable here and are worried about possible movement may be harmful to her  Time spent discussing dying process and providing reassurance   -    3  Psychosocial support- supportive listening provided   -    4  PSC follow-up- will follow    5  Prognosis likely hours to days        Code Status:  DNR DNI - Level for   Decisional apparatus:  Patient is not competent on my exam today  If competence is lost, patient's substitute decision maker would default to children by [place] 169  Advance Directive / Living Will / POLST:  None on file    Interval history:       Patient seen today surrounded by family and foster children  They feel that she is comfortable with current measures being taken  They did have questions about her secretions and skin discoloration and time discussing end of life with family addressing all their questions and concerns      MEDICATIONS / ALLERGIES:     all current active meds have been reviewed and current meds:   Current Facility-Administered Medications   Medication Dose Route Frequency   • glycopyrrolate (ROBINUL) injection 0 1 mg  0 1 mg Intravenous Q4H PRN   • lidocaine (LMX) 4 % cream   Topical Once   • LORazepam (ATIVAN) injection 0 5 mg  0 5 mg Intravenous Q20 Min PRN   • morphine injection 2 mg  2 mg Intravenous Q1H PRN       No Known Allergies    OBJECTIVE:    Physical Exam  Physical Exam  Vitals and nursing note reviewed  Constitutional:       General: She is not in acute distress  Appearance: She is ill-appearing  Comments: Frail and cachectic   HENT:      Head: Normocephalic and atraumatic  Right Ear: External ear normal       Left Ear: External ear normal    Eyes:      General:         Right eye: No discharge  Left eye: No discharge  Cardiovascular:      Rate and Rhythm: Normal rate and regular rhythm  Comments: Distant with thready pulses  Pulmonary:      Comments: Shallow, no apnea  Abdominal:      General: There is no distension  Palpations: Abdomen is soft  Musculoskeletal:         General: No edema  Skin:     Coloration: Skin is pale  Neurological:      Comments: Unresponsive           Lab Results:   I have personally reviewed pertinent labs  , CBC: No results found for: WBC, HGB, HCT, MCV, PLT, ADJUSTEDWBC, MCH, MCHC, RDW, MPV, NRBC, CMP:   Lab Results   Component Value Date    SODIUM 134 (L) 11/15/2022    K 3 6 11/15/2022    CL 82 (L) 11/15/2022    CO2 >45 (HH) 11/15/2022    BUN 17 11/15/2022    CREATININE 0 62 11/15/2022    CALCIUM 9 0 11/15/2022    EGFR 91 11/15/2022   , PT/PTT:  Lab Results   Component Value Date    PTT 76 (H) 11/15/2022     Imaging Studies:  No new, comfort care  EKG, Pathology, and Other Studies:  No new, comfort care    Counseling / Coordination of Care    Total floor / unit time spent today 25+ minutes  Greater than 50% of total time was spent with the patient and / or family counseling and / or coordination of care  A description of the counseling / coordination of care:  Chart review, medication review, supportive listening, review of end of life care, review of hospice      Portions of this document may have been created using dictation software and as such some "sound alike" terms may have been generated by the system  Do not hesitate to contact me with any questions or clarifications

## 2022-11-16 NOTE — DISCHARGE SUMMARY
INTERNAL MEDICINE RESIDENCY DISCHARGE SUMMARY     Madison Arreguin   71 y o  female  MRN: [id number]  Room/Bed: ICCU 201/ICCU 201-01     [facility]   Encounter: [id number]    Principal Problem:    Acute respiratory failure with hypoxia [facility])  Active Problems:    Atrial flutter with rapid ventricular response (HCC)    Volume overload    Renal mass    Hernia of abdominal wall    Aneurysm of infrarenal abdominal aorta      * Acute respiratory failure with hypoxia (HCC)  Assessment & Plan  Presented with progressive fatigue, weight loss with continued hypoxic respiratory failure and LV decompensation despite BiPAP and continued diuresis  11/12/2022 Echo reading LVEF 30%, with severe global hypokinesis  With continued decline both patient, family, and care team decided on comfort care measures only as an inpatient  Plan prior to expiration:   - continue morphine 2mg q 1 hr for pain, air hunger, distress  - continue Ativan 0 5mg q 20min for anxiety and agitation  - start glycopyrrolate 0 1 mg q 4 hours for gurgling and secretions      Atrial flutter with rapid ventricular response (HCC)  Assessment & Plan  Presented with A flutter rates 120's, managed during hospital stay with rate control cardizem gtt, AC heparin gtt, digoxin 125 mcg, coreg 3 125 mg BID,  hydralazine 75 mg Q8, isordil 30 mg Q8, nitro gtt  Rate is now controlled in 80's  /62 last checked  - continued comfort care measures only    Volume overload  Assessment & Plan  Presented with hypervolemia, nt proBNP 20,923, and peripheral edema, HFrEF, pleural effusions  Patient diuresed with IV lasix  Now with 1+ pitting edema bilaterally  I/o  -1,357ml     - continued comfort care measures  - case in place for comfort    Aneurysm of infrarenal abdominal aorta  Assessment & Plan  In ED, CT revealed infrarenal abdominal aortic aneurysm measuring 4 5 cm with aortic stenosis and focal narrowing of the infrarenal aorta lumen to 10 mm    - continued comfort care measures only    Hernia of abdominal wall  Assessment & Plan  CT revealed Large bowel containing ventral hernia  - continued comfort care measures only    Renal mass  Assessment & Plan  On admission CT Abd Pelvis revealed complex 3 3 cm right lower pole renal mass with concern for Renal cell carcinoma  No prior work up  Cachetic appearing, hx of over 80lb unexplained weight loss over the past year  - continued comfort care measures only      [address]     As per HPI by Dr Yarelis Morrison "Patient has not seen a physician in 40 years  Unvaccinated - no flu/tdap/pneumococcal vaccines  Denies recent sick contacts  Brought in by family out of concern for progressive weakness and sleeping for longer periods of time  Patient lives with 4 foster children ages 15-34 but they are all mentally disabled and are unable to help take care of her  They noted that the patient fell asleep in her car at one point when she was sitting in her car parked  Patient is typically fully capable of all ADLs and IADLs  She has also had 2 falls at home but denies LOC or headstrike  She denies lightheadedness or dizziness      Social: 87 pack year smoker, quit 6 weeks ago but did not say why or tell family why  Never rec drug user, including IDVU  Does not drink alcohol  Has three children  All present at bedside during interview  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 w/mental disabilities over the past several decades    Elsa King was brought from home to [facility] ED AM of 11/11 and EKG showed aflutter  Trops negative, proBNP 21k  Bedside echo showed severely impaired EF  BLE swelling present, reported as 4+, so duplex were ordered for BLE and negative for DVT  D-dimer (+), CTA PE ordered  Negative for PE   However, it showed radiographic findings of pulm HTN, bilateral pulmonary effusions, and 3 cm renal mass c/f renal cell carcinoma  Upon presentation to [facility] patient arrived on nitro drip, heparin drip, and was continued on biPAP  VS were HR 100s afib, /64, satting 92% on 18/10 at 50% "    Patient was placed on nitro drip, Cardiology was consulted, and patient was admitted to medicine ICU  Patient continued to have elevated hypertensive state and was intermittently between atrial flutter with RVR and sinus rhythm  She continued have desaturations requiring BiPAP use  Patient was seen and evaluated by palliative care team and was transitioned to comfort care level 4 code status on 11/15  She was placed on low-flow supplemental oxygen for comfort measures, other comfort measures for added to make patient comfortable  Patient's family was by bedside and were agreeable with patient's decision  Patient was transferred from medicine ICU service to general floors  Patient passed away on 11/16 while still in ICCU  Please see progress notes/death note for vitals and physical exam on day of discharge  DISCHARGE INFORMATION     PCP at Discharge:  No primary care provider on file  Admitting Provider: Henna Perera MD  Admission Date: 11/12/2022    Discharge Provider: Chely Ferrer MD  Discharge Date: 11/16/2022    Discharge Disposition: Passed away  Discharge Condition: Passed away  Discharge with Lines: no    Discharge Diet: N/A  Activity Restrictions: N/A  Test Results Pending at Discharge: n/a    Discharge Diagnoses:  Principal Problem:    Acute respiratory failure with hypoxia ([address] )  Active Problems:    Atrial flutter with rapid ventricular response (HCC)    Volume overload    Renal mass    Hernia of abdominal wall    Aneurysm of infrarenal abdominal aorta  Resolved Problems:    * No resolved hospital problems   *      Consulting Providers:   Heart failure, Palliative Care    Diagnostic & Therapeutic Procedures Performed:  XR chest 1 view portable    Result Date: 11/12/2022  Impression: Worsening vascular congestion and increased moderate right pleural effusion  Unchanged moderate left pleural effusion  Workstation performed: [id number]     XR chest portable ICU    Result Date: 11/15/2022  Impression: Persistent large effusions and extensive bibasilar atelectasis  Workstation performed: [id number]       Code Status: Level 4 - Comfort Care  Advance Directive & Living Will: <no information>  Power of :    POLST:      Medications: There are no discharge medications for this patient  There are no discharge medications for this patient  There are no discharge medications for this patient  Allergies:  No Known Allergies    FOLLOW-UP     PCP Outpatient Follow-up:  none required    Consulting Providers Follow-up:  none required     Active Issues Requiring Follow-up:   none    Discharge Statement:   I spent 45 minutes discharging the patient  This time was spent on the day of discharge  I had direct contact with the patient on the day of discharge  Additional documentation is required if more than 30 minutes were spent on discharge  Portions of the record may have been created with voice recognition software  Occasional wrong word or "sound a like" substitutions may have occurred due to the inherent limitations of voice recognition software  Read the chart carefully and recognize, using context, where substitutions have occurred

## 2022-11-17 ENCOUNTER — EPISODE CHANGES (OUTPATIENT)
Dept: CASE MANAGEMENT | Facility: OTHER | Age: 72
End: 2022-11-17